# Patient Record
Sex: MALE | Race: BLACK OR AFRICAN AMERICAN | NOT HISPANIC OR LATINO | Employment: FULL TIME | URBAN - METROPOLITAN AREA
[De-identification: names, ages, dates, MRNs, and addresses within clinical notes are randomized per-mention and may not be internally consistent; named-entity substitution may affect disease eponyms.]

---

## 2019-09-27 ENCOUNTER — OFFICE VISIT (OUTPATIENT)
Dept: OBGYN CLINIC | Facility: CLINIC | Age: 59
End: 2019-09-27

## 2019-09-27 VITALS — HEART RATE: 87 BPM | SYSTOLIC BLOOD PRESSURE: 160 MMHG | DIASTOLIC BLOOD PRESSURE: 87 MMHG

## 2019-09-27 DIAGNOSIS — Z02.4 ENCOUNTER FOR CDL (COMMERCIAL DRIVING LICENSE) EXAM: ICD-10-CM

## 2019-09-27 DIAGNOSIS — Z89.611 HISTORY OF RIGHT LOWER EXTREMITY AMPUTATION (HCC): Primary | ICD-10-CM

## 2019-09-27 PROCEDURE — 99203 OFFICE O/P NEW LOW 30 MIN: CPT | Performed by: PHYSICAL MEDICINE & REHABILITATION

## 2019-09-27 NOTE — PROGRESS NOTES
1  History of right lower extremity amputation ([facility] [address] )     2  Encounter for CDL (commercial driving license) exam       No orders of the defined types were placed in this encounter  Imaging Studies (I personally reviewed images in PACS and report):  No images pertaining to this encounter  Impression:  The patient presents for  ring license examination since he has a right below-knee amputation  I filled out his paperwork today that he is fully cleared to continue driving  He has a well-fitting prostheses with no evidence of skin breakdown he is able to show me how to don and doff the prostheses without any issues  No follow-ups on file  HPI:  Maximilian Man is a 66 y o  male  who presents for evaluation of No chief complaint on file  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  He has been driving for many years without any issues  His prostheses has been fitting him without any problems  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   He has no complaints or concerns at this time  He has been able to do his job without any issues for the past few years  Review of Systems   Constitutional: Negative for activity change and fever  HENT: Negative for sore throat  Eyes: Negative for visual disturbance  Respiratory: Negative for shortness of breath  Cardiovascular: Negative for chest pain  Gastrointestinal: Negative for abdominal pain  Endocrine: Negative for polydipsia  Genitourinary: Negative for difficulty urinating  Musculoskeletal: Negative for arthralgias and gait problem  Skin: Negative for color change, pallor, rash and wound  Allergic/Immunologic: Negative for immunocompromised state  Neurological: Negative for weakness and numbness  Hematological: Does not bruise/bleed easily  Psychiatric/Behavioral: Negative for confusion         Following history reviewed and updated:  Past Medical History: Diagnosis Date    BKA stump complication ([facility] [address] )     Diabetes insipidus ([facility] [address] )      Past Surgical History:   Procedure Laterality Date    BELOW KNEE LEG AMPUTATION      LEG SURGERY      MANDIBLE FRACTURE SURGERY       Social History   Social History     Substance and Sexual Activity   Alcohol Use Not Currently     Social History     Substance and Sexual Activity   Drug Use Not Currently     Social History     Tobacco Use   Smoking Status Never Smoker   Smokeless Tobacco Never Used     History reviewed  No pertinent family history  No Known Allergies     Constitutional:  /87 (BP Location: Left arm, Patient Position: Sitting, Cuff Size: Standard)   Pulse 87    General: NAD  Eyes: Anicteric sclerae  Neck: Supple  Lungs: Unlabored breathing  Cardiovascular: No lower extremity edema  Skin: Intact without erythema  Neurologic: Sensation intact to light touch  Psychiatric: Mood and affect are appropriate  Right Knee Exam   Right knee exam is normal     Muscle Strength   The patient has normal right knee strength  Tenderness   The patient is experiencing no tenderness  Range of Motion   The patient has normal right knee ROM  Extension: normal   Flexion: normal     Other   Erythema: absent  Scars: absent  Sensation: normal  Pulse: present  Swelling: none  Effusion: no effusion present    Comments:  Full strength with knee flexion and extension  Fully healed residual limb  No fluctuance  Below knee prosthesis with good fit  Able to demonstrate donning and doffing with no issues  Procedures - none for this visit

## 2022-03-19 PROBLEM — E11.21 TYPE 2 DIABETES WITH NEPHROPATHY (HCC): Status: ACTIVE | Noted: 2018-03-23

## 2022-03-19 PROBLEM — N18.30 CKD (CHRONIC KIDNEY DISEASE) STAGE 3, GFR 30-59 ML/MIN (HCC): Status: ACTIVE | Noted: 2019-08-25

## 2023-06-29 ENCOUNTER — TELEPHONE (OUTPATIENT)
Dept: INTERNAL MEDICINE CLINIC | Facility: CLINIC | Age: 63
End: 2023-06-29

## 2023-07-06 ENCOUNTER — OFFICE VISIT (OUTPATIENT)
Dept: INTERNAL MEDICINE CLINIC | Facility: CLINIC | Age: 63
End: 2023-07-06
Payer: MEDICAID

## 2023-07-06 VITALS
SYSTOLIC BLOOD PRESSURE: 155 MMHG | WEIGHT: 158 LBS | HEIGHT: 76 IN | DIASTOLIC BLOOD PRESSURE: 85 MMHG | OXYGEN SATURATION: 100 % | TEMPERATURE: 97.4 F | BODY MASS INDEX: 19.24 KG/M2 | HEART RATE: 79 BPM

## 2023-07-06 DIAGNOSIS — Z89.511 S/P BKA (BELOW KNEE AMPUTATION), RIGHT (HCC): ICD-10-CM

## 2023-07-06 DIAGNOSIS — E11.65 TYPE 2 DIABETES MELLITUS WITH HYPERGLYCEMIA, WITHOUT LONG-TERM CURRENT USE OF INSULIN (HCC): Primary | ICD-10-CM

## 2023-07-06 DIAGNOSIS — N18.4 CHRONIC KIDNEY DISEASE, STAGE 4 (SEVERE) (HCC): ICD-10-CM

## 2023-07-06 DIAGNOSIS — T87.9: ICD-10-CM

## 2023-07-06 DIAGNOSIS — N40.0 BENIGN PROSTATIC HYPERPLASIA WITHOUT LOWER URINARY TRACT SYMPTOMS: ICD-10-CM

## 2023-07-06 LAB
ALBUMIN SERPL-MCNC: 3.3 G/DL (ref 3.2–4.6)
ALBUMIN/GLOB SERPL: 1 (ref 0.4–1.6)
ALP SERPL-CCNC: 168 U/L (ref 50–136)
ALT SERPL-CCNC: 17 U/L (ref 12–65)
ANION GAP SERPL CALC-SCNC: 8 MMOL/L (ref 2–11)
AST SERPL-CCNC: 9 U/L (ref 15–37)
BASOPHILS # BLD: 0.1 K/UL (ref 0–0.2)
BASOPHILS NFR BLD: 1 % (ref 0–2)
BILIRUB SERPL-MCNC: 0.4 MG/DL (ref 0.2–1.1)
BUN SERPL-MCNC: 46 MG/DL (ref 8–23)
CALCIUM SERPL-MCNC: 9 MG/DL (ref 8.3–10.4)
CHLORIDE SERPL-SCNC: 106 MMOL/L (ref 101–110)
CHOLEST SERPL-MCNC: 140 MG/DL
CO2 SERPL-SCNC: 22 MMOL/L (ref 21–32)
CREAT SERPL-MCNC: 4.1 MG/DL (ref 0.8–1.5)
CREAT UR-MCNC: 98 MG/DL
DIFFERENTIAL METHOD BLD: ABNORMAL
EOSINOPHIL # BLD: 0.1 K/UL (ref 0–0.8)
EOSINOPHIL NFR BLD: 2 % (ref 0.5–7.8)
ERYTHROCYTE [DISTWIDTH] IN BLOOD BY AUTOMATED COUNT: 12.1 % (ref 11.9–14.6)
GLOBULIN SER CALC-MCNC: 3.2 G/DL (ref 2.8–4.5)
GLUCOSE SERPL-MCNC: 427 MG/DL (ref 65–100)
GLUCOSE, POC: 423 MG/DL
HCT VFR BLD AUTO: 34.1 % (ref 41.1–50.3)
HDLC SERPL-MCNC: 61 MG/DL (ref 40–60)
HDLC SERPL: 2.3
HGB BLD-MCNC: 10.7 G/DL (ref 13.6–17.2)
IMM GRANULOCYTES # BLD AUTO: 0.1 K/UL (ref 0–0.5)
IMM GRANULOCYTES NFR BLD AUTO: 1 % (ref 0–5)
LDLC SERPL CALC-MCNC: 63.8 MG/DL
LYMPHOCYTES # BLD: 1.6 K/UL (ref 0.5–4.6)
LYMPHOCYTES NFR BLD: 19 % (ref 13–44)
MCH RBC QN AUTO: 30.3 PG (ref 26.1–32.9)
MCHC RBC AUTO-ENTMCNC: 31.4 G/DL (ref 31.4–35)
MCV RBC AUTO: 96.6 FL (ref 82–102)
MICROALBUMIN UR-MCNC: 145 MG/DL
MICROALBUMIN/CREAT UR-RTO: 1480 MG/G (ref 0–30)
MONOCYTES # BLD: 0.4 K/UL (ref 0.1–1.3)
MONOCYTES NFR BLD: 5 % (ref 4–12)
NEUTS SEG # BLD: 6 K/UL (ref 1.7–8.2)
NEUTS SEG NFR BLD: 72 % (ref 43–78)
NRBC # BLD: 0 K/UL (ref 0–0.2)
PLATELET # BLD AUTO: 252 K/UL (ref 150–450)
PMV BLD AUTO: 11.6 FL (ref 9.4–12.3)
POTASSIUM SERPL-SCNC: 4.4 MMOL/L (ref 3.5–5.1)
PROT SERPL-MCNC: 6.5 G/DL (ref 6.3–8.2)
PSA SERPL-MCNC: 0.7 NG/ML
RBC # BLD AUTO: 3.53 M/UL (ref 4.23–5.6)
SODIUM SERPL-SCNC: 136 MMOL/L (ref 133–143)
TRIGL SERPL-MCNC: 76 MG/DL (ref 35–150)
VLDLC SERPL CALC-MCNC: 15.2 MG/DL (ref 6–23)
WBC # BLD AUTO: 8.2 K/UL (ref 4.3–11.1)

## 2023-07-06 PROCEDURE — 82962 GLUCOSE BLOOD TEST: CPT | Performed by: NURSE PRACTITIONER

## 2023-07-06 PROCEDURE — 99214 OFFICE O/P EST MOD 30 MIN: CPT | Performed by: NURSE PRACTITIONER

## 2023-07-06 RX ORDER — INSULIN GLARGINE 100 [IU]/ML
10 INJECTION, SOLUTION SUBCUTANEOUS NIGHTLY
Qty: 5 ADJUSTABLE DOSE PRE-FILLED PEN SYRINGE | Refills: 0 | Status: SHIPPED | OUTPATIENT
Start: 2023-07-06

## 2023-07-06 RX ORDER — TAMSULOSIN HYDROCHLORIDE 0.4 MG/1
0.4 CAPSULE ORAL DAILY
Qty: 90 CAPSULE | Refills: 1 | Status: SHIPPED | OUTPATIENT
Start: 2023-07-06

## 2023-07-06 RX ORDER — ALOGLIPTIN 25 MG/1
25 TABLET, FILM COATED ORAL DAILY
Qty: 90 TABLET | Refills: 1 | Status: SHIPPED | OUTPATIENT
Start: 2023-07-06

## 2023-07-06 ASSESSMENT — PATIENT HEALTH QUESTIONNAIRE - PHQ9
SUM OF ALL RESPONSES TO PHQ9 QUESTIONS 1 & 2: 0
1. LITTLE INTEREST OR PLEASURE IN DOING THINGS: 0
SUM OF ALL RESPONSES TO PHQ QUESTIONS 1-9: 0
2. FEELING DOWN, DEPRESSED OR HOPELESS: 0
SUM OF ALL RESPONSES TO PHQ QUESTIONS 1-9: 0

## 2023-07-06 ASSESSMENT — ENCOUNTER SYMPTOMS: BLOOD IN STOOL: 0

## 2023-07-06 NOTE — PROGRESS NOTES
insulin injected in the office subcutaneously, he was given a blood sugar monitor and supplies for him to begin to monitor. I called in his alogliptin and starting long-acting insulin at 10 units daily in anticipation of an A1c of over 10. We will readjust if needed. We will have him follow-up closely in 1 week and adjust his insulin up as needed. Discussed the delirious effects of untreated hyperglycemia for his health. 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 He verbalized understanding.  - Hemoglobin A1C; Future  - Comprehensive Metabolic Panel; Future  - CBC with Auto Differential; Future  - alogliptin (NESINA) 25 MG TABS tablet; Take 1 tablet by mouth daily  Dispense: 90 tablet; Refill: 1  - Microalbumin / Creatinine Urine Ratio; Future  - insulin glargine (BASAGLAR KWIKPEN) 100 UNIT/ML injection pen; Inject 10 Units into the skin nightly  Dispense: 5 Adjustable Dose Pre-filled Pen Syringe; Refill: 0  - Hemoglobin A1C  - CBC with Auto Differential  - Comprehensive Metabolic Panel  - Microalbumin / Creatinine Urine Ratio    2. S/P BKA (below knee amputation), right ([address])      3. Unspecified complications of amputation stump ([address])  Needs an order for a new compression sleeve to avoid ulcer formation, written for him today. 4. Benign prostatic hyperplasia without lower urinary tract symptoms  Refills provided  - PSA Screening; Future  - tamsulosin (FLOMAX) 0.4 MG capsule; Take 1 capsule by mouth daily TAKE 1 CAPSULE BY MOUTH ONCE DAILY  Dispense: 90 capsule; Refill: 1  - PSA Screening    5. Chronic kidney disease, stage 4 (severe) ([facility])    - Comprehensive Metabolic Panel; Future  - Comprehensive Metabolic Panel      Discussed HPI with Dr. Carlyn Gilbert. He agrees to plan of care.     LIEN Park - CNP

## 2023-07-07 ENCOUNTER — TELEPHONE (OUTPATIENT)
Dept: INTERNAL MEDICINE CLINIC | Facility: CLINIC | Age: 63
End: 2023-07-07

## 2023-07-07 DIAGNOSIS — N18.9 ACUTE KIDNEY INJURY SUPERIMPOSED ON CKD (HCC): Primary | ICD-10-CM

## 2023-07-07 DIAGNOSIS — N17.9 ACUTE KIDNEY INJURY SUPERIMPOSED ON CKD (HCC): Primary | ICD-10-CM

## 2023-07-07 LAB
EST. AVERAGE GLUCOSE BLD GHB EST-MCNC: 283 MG/DL
HBA1C MFR BLD: 11.5 % (ref 4.8–5.6)

## 2023-07-09 ENCOUNTER — TELEPHONE (OUTPATIENT)
Dept: INTERNAL MEDICINE CLINIC | Facility: CLINIC | Age: 63
End: 2023-07-09

## 2023-07-10 NOTE — TELEPHONE ENCOUNTER
I have talked with Mr. Guerita Lord and have given him this message. He also stated that he hasn't gotten in with the nephrologist yet.

## 2023-07-12 NOTE — TELEPHONE ENCOUNTER
Left message for pt to return call    [place] [facility] New Patient referral coordinator is out till 07/17/2023 was told this would get addressed when she gets back. Pt is scheduled for a f/u with you tomorrow. adolfo Lore

## 2023-07-13 ENCOUNTER — OFFICE VISIT (OUTPATIENT)
Dept: INTERNAL MEDICINE CLINIC | Facility: CLINIC | Age: 63
End: 2023-07-13
Payer: MEDICAID

## 2023-07-13 VITALS
BODY MASS INDEX: 19.48 KG/M2 | WEIGHT: 160 LBS | DIASTOLIC BLOOD PRESSURE: 81 MMHG | SYSTOLIC BLOOD PRESSURE: 141 MMHG | TEMPERATURE: 98.1 F | HEIGHT: 76 IN | OXYGEN SATURATION: 100 % | HEART RATE: 78 BPM

## 2023-07-13 DIAGNOSIS — Z79.4 TYPE 2 DIABETES MELLITUS WITH HYPERGLYCEMIA, WITH LONG-TERM CURRENT USE OF INSULIN (HCC): ICD-10-CM

## 2023-07-13 DIAGNOSIS — E11.65 TYPE 2 DIABETES MELLITUS WITH HYPERGLYCEMIA, WITH LONG-TERM CURRENT USE OF INSULIN (HCC): Primary | ICD-10-CM

## 2023-07-13 DIAGNOSIS — Z79.4 TYPE 2 DIABETES MELLITUS WITH HYPERGLYCEMIA, WITH LONG-TERM CURRENT USE OF INSULIN (HCC): Primary | ICD-10-CM

## 2023-07-13 DIAGNOSIS — E11.65 TYPE 2 DIABETES MELLITUS WITH HYPERGLYCEMIA, WITH LONG-TERM CURRENT USE OF INSULIN (HCC): ICD-10-CM

## 2023-07-13 PROCEDURE — 99214 OFFICE O/P EST MOD 30 MIN: CPT | Performed by: NURSE PRACTITIONER

## 2023-07-13 PROCEDURE — 3046F HEMOGLOBIN A1C LEVEL >9.0%: CPT | Performed by: NURSE PRACTITIONER

## 2023-07-13 RX ORDER — INSULIN GLARGINE 100 [IU]/ML
20 INJECTION, SOLUTION SUBCUTANEOUS NIGHTLY
Qty: 5 ADJUSTABLE DOSE PRE-FILLED PEN SYRINGE | Refills: 0
Start: 2023-07-13

## 2023-07-13 RX ORDER — LANCETS 33 GAUGE
1 EACH MISCELLANEOUS DAILY
Qty: 100 EACH | Refills: 3 | Status: SHIPPED | OUTPATIENT
Start: 2023-07-13

## 2023-07-13 RX ORDER — LANCETS
1 EACH MISCELLANEOUS DAILY
Qty: 100 EACH | Refills: 3 | Status: SHIPPED | OUTPATIENT
Start: 2023-07-13

## 2023-07-13 RX ORDER — LANCETS 30 GAUGE
1 EACH MISCELLANEOUS DAILY
Qty: 100 EACH | Refills: 3 | Status: SHIPPED | OUTPATIENT
Start: 2023-07-13

## 2023-07-13 SDOH — ECONOMIC STABILITY: INCOME INSECURITY: HOW HARD IS IT FOR YOU TO PAY FOR THE VERY BASICS LIKE FOOD, HOUSING, MEDICAL CARE, AND HEATING?: NOT VERY HARD

## 2023-07-13 SDOH — ECONOMIC STABILITY: FOOD INSECURITY: WITHIN THE PAST 12 MONTHS, YOU WORRIED THAT YOUR FOOD WOULD RUN OUT BEFORE YOU GOT MONEY TO BUY MORE.: SOMETIMES TRUE

## 2023-07-13 SDOH — ECONOMIC STABILITY: FOOD INSECURITY: WITHIN THE PAST 12 MONTHS, THE FOOD YOU BOUGHT JUST DIDN'T LAST AND YOU DIDN'T HAVE MONEY TO GET MORE.: SOMETIMES TRUE

## 2023-07-13 SDOH — ECONOMIC STABILITY: HOUSING INSECURITY
IN THE LAST 12 MONTHS, WAS THERE A TIME WHEN YOU DID NOT HAVE A STEADY PLACE TO SLEEP OR SLEPT IN A SHELTER (INCLUDING NOW)?: NO

## 2023-07-13 ASSESSMENT — ENCOUNTER SYMPTOMS: SHORTNESS OF BREATH: 0

## 2023-07-13 NOTE — PROGRESS NOTES
week.  Supplies have been ordered. He will let us know if he wants to continue having a Dexcom continuous glucose monitor. He does follow-up with nephrology. We reviewed the importance of getting his blood sugar under control and possible complications of hyperglycemia will cause. He assured me that he will follow-up with nephrology and continue to upward titrate his long-acting insulin to get his fasting blood sugars below 150.  - insulin glargine (BASAGLAR KWIKPEN) 100 UNIT/ML injection pen; Inject 20 Units into the skin nightly  Dispense: 5 Adjustable Dose Pre-filled Pen Syringe; Refill: 0  - Accu-Chek Multiclix Lancets MISC; 1 applicator by Does not apply route daily  Dispense: 100 each; Refill: 3  - blood glucose test strips (ASCENSIA AUTODISC VI;ONE TOUCH ULTRA TEST VI) strip; 1 each by In Vitro route daily As needed. Dispense: 100 each; Refill: 3  - Insulin Pen Needle 29G X 12MM MISC; 1 each by Does not apply route daily  Dispense: 100 each;  Refill: 3          LIEN Yarbrough - CNP

## 2023-07-13 NOTE — TELEPHONE ENCOUNTER
I assume this is it. They have other options with specific gauges. I have pended that as well for you to choose from.

## 2023-07-27 ENCOUNTER — TELEPHONE (OUTPATIENT)
Dept: INTERNAL MEDICINE CLINIC | Facility: CLINIC | Age: 63
End: 2023-07-27

## 2023-07-27 DIAGNOSIS — D64.9 ANEMIA, UNSPECIFIED TYPE: Primary | ICD-10-CM

## 2023-07-27 NOTE — TELEPHONE ENCOUNTER
Tell him ultrasound looks good. He should be hearing from the kidney specialist about these things as well. Thanks!

## 2023-07-27 NOTE — TELEPHONE ENCOUNTER
Pt scheduled for Monday. He mentions that he had blood work done 7/24 along with a kidney US that he has not heard back from. I see the lab work in care everywhere but not the [address]

## 2023-07-31 ENCOUNTER — NURSE ONLY (OUTPATIENT)
Dept: INTERNAL MEDICINE CLINIC | Facility: CLINIC | Age: 63
End: 2023-07-31

## 2023-07-31 DIAGNOSIS — D64.9 ANEMIA, UNSPECIFIED TYPE: ICD-10-CM

## 2023-08-01 LAB
FERRITIN SERPL-MCNC: 221 NG/ML (ref 8–388)
FOLATE SERPL-MCNC: 6.5 NG/ML (ref 3.1–17.5)
TIBC SERPL-MCNC: 250 UG/DL (ref 250–450)
VIT B12 SERPL-MCNC: 443 PG/ML (ref 193–986)

## 2023-11-03 ENCOUNTER — OFFICE VISIT (OUTPATIENT)
Dept: INTERNAL MEDICINE CLINIC | Facility: CLINIC | Age: 63
End: 2023-11-03
Payer: MEDICAID

## 2023-11-03 VITALS
TEMPERATURE: 99 F | HEIGHT: 76 IN | BODY MASS INDEX: 20.46 KG/M2 | DIASTOLIC BLOOD PRESSURE: 67 MMHG | SYSTOLIC BLOOD PRESSURE: 125 MMHG | WEIGHT: 168 LBS | RESPIRATION RATE: 16 BRPM | HEART RATE: 84 BPM

## 2023-11-03 DIAGNOSIS — N18.31 STAGE 3A CHRONIC KIDNEY DISEASE (HCC): ICD-10-CM

## 2023-11-03 DIAGNOSIS — Z79.4 TYPE 2 DIABETES MELLITUS WITH HYPERGLYCEMIA, WITH LONG-TERM CURRENT USE OF INSULIN (HCC): ICD-10-CM

## 2023-11-03 DIAGNOSIS — Z23 NEEDS FLU SHOT: Primary | ICD-10-CM

## 2023-11-03 DIAGNOSIS — S76.112D RUPTURE OF LEFT QUADRICEPS MUSCLE, SUBSEQUENT ENCOUNTER: ICD-10-CM

## 2023-11-03 DIAGNOSIS — E11.21 TYPE 2 DIABETES MELLITUS WITH DIABETIC NEPHROPATHY, WITH LONG-TERM CURRENT USE OF INSULIN (HCC): ICD-10-CM

## 2023-11-03 DIAGNOSIS — Z79.4 TYPE 2 DIABETES MELLITUS WITH DIABETIC NEPHROPATHY, WITH LONG-TERM CURRENT USE OF INSULIN (HCC): ICD-10-CM

## 2023-11-03 DIAGNOSIS — R53.82 CHRONIC FATIGUE: ICD-10-CM

## 2023-11-03 DIAGNOSIS — N40.0 BENIGN PROSTATIC HYPERPLASIA WITHOUT LOWER URINARY TRACT SYMPTOMS: ICD-10-CM

## 2023-11-03 DIAGNOSIS — E11.65 TYPE 2 DIABETES MELLITUS WITH HYPERGLYCEMIA, WITH LONG-TERM CURRENT USE OF INSULIN (HCC): ICD-10-CM

## 2023-11-03 LAB
ALBUMIN SERPL-MCNC: 3.3 G/DL (ref 3.2–4.6)
ALBUMIN/GLOB SERPL: 0.9 (ref 0.4–1.6)
ALP SERPL-CCNC: 73 U/L (ref 50–136)
ALT SERPL-CCNC: 17 U/L (ref 12–65)
ANION GAP SERPL CALC-SCNC: 11 MMOL/L (ref 2–11)
AST SERPL-CCNC: 18 U/L (ref 15–37)
BASOPHILS # BLD: 0.1 K/UL (ref 0–0.2)
BASOPHILS NFR BLD: 1 % (ref 0–2)
BILIRUB SERPL-MCNC: 0.3 MG/DL (ref 0.2–1.1)
BUN SERPL-MCNC: 69 MG/DL (ref 8–23)
CALCIUM SERPL-MCNC: 8.3 MG/DL (ref 8.3–10.4)
CHLORIDE SERPL-SCNC: 115 MMOL/L (ref 101–110)
CHOLEST SERPL-MCNC: 142 MG/DL
CO2 SERPL-SCNC: 12 MMOL/L (ref 21–32)
CREAT SERPL-MCNC: 5.1 MG/DL (ref 0.8–1.5)
DIFFERENTIAL METHOD BLD: ABNORMAL
EOSINOPHIL # BLD: 0.1 K/UL (ref 0–0.8)
EOSINOPHIL NFR BLD: 2 % (ref 0.5–7.8)
ERYTHROCYTE [DISTWIDTH] IN BLOOD BY AUTOMATED COUNT: 12.5 % (ref 11.9–14.6)
EST. AVERAGE GLUCOSE BLD GHB EST-MCNC: 117 MG/DL
GLOBULIN SER CALC-MCNC: 3.8 G/DL (ref 2.8–4.5)
GLUCOSE SERPL-MCNC: 90 MG/DL (ref 65–100)
HBA1C MFR BLD: 5.7 % (ref 4.8–5.6)
HCT VFR BLD AUTO: 26.5 % (ref 41.1–50.3)
HDLC SERPL-MCNC: 78 MG/DL (ref 40–60)
HDLC SERPL: 1.8
HGB BLD-MCNC: 8 G/DL (ref 13.6–17.2)
IMM GRANULOCYTES # BLD AUTO: 0 K/UL (ref 0–0.5)
IMM GRANULOCYTES NFR BLD AUTO: 0 % (ref 0–5)
LDLC SERPL CALC-MCNC: 54.2 MG/DL
LYMPHOCYTES # BLD: 1.7 K/UL (ref 0.5–4.6)
LYMPHOCYTES NFR BLD: 24 % (ref 13–44)
MCH RBC QN AUTO: 29.4 PG (ref 26.1–32.9)
MCHC RBC AUTO-ENTMCNC: 30.2 G/DL (ref 31.4–35)
MCV RBC AUTO: 97.4 FL (ref 82–102)
MONOCYTES # BLD: 0.6 K/UL (ref 0.1–1.3)
MONOCYTES NFR BLD: 9 % (ref 4–12)
NEUTS SEG # BLD: 4.6 K/UL (ref 1.7–8.2)
NEUTS SEG NFR BLD: 64 % (ref 43–78)
NRBC # BLD: 0 K/UL (ref 0–0.2)
PLATELET # BLD AUTO: 214 K/UL (ref 150–450)
PMV BLD AUTO: 10.9 FL (ref 9.4–12.3)
POTASSIUM SERPL-SCNC: 4 MMOL/L (ref 3.5–5.1)
PROT SERPL-MCNC: 7.1 G/DL (ref 6.3–8.2)
RBC # BLD AUTO: 2.72 M/UL (ref 4.23–5.6)
SODIUM SERPL-SCNC: 138 MMOL/L (ref 133–143)
TRIGL SERPL-MCNC: 49 MG/DL (ref 35–150)
VLDLC SERPL CALC-MCNC: 9.8 MG/DL (ref 6–23)
WBC # BLD AUTO: 7.1 K/UL (ref 4.3–11.1)

## 2023-11-03 PROCEDURE — 99214 OFFICE O/P EST MOD 30 MIN: CPT | Performed by: INTERNAL MEDICINE

## 2023-11-03 PROCEDURE — 90674 CCIIV4 VAC NO PRSV 0.5 ML IM: CPT | Performed by: INTERNAL MEDICINE

## 2023-11-03 PROCEDURE — 3046F HEMOGLOBIN A1C LEVEL >9.0%: CPT | Performed by: INTERNAL MEDICINE

## 2023-11-03 PROCEDURE — 90471 IMMUNIZATION ADMIN: CPT | Performed by: INTERNAL MEDICINE

## 2023-11-03 RX ORDER — HYDROCODONE BITARTRATE AND ACETAMINOPHEN 5; 325 MG/1; MG/1
TABLET ORAL
COMMUNITY
Start: 2023-10-31

## 2023-11-03 NOTE — PROGRESS NOTES
Comprehensive Metabolic Panel; Future  - Comprehensive Metabolic Panel  - CBC with Auto Differential      No follow-up provider specified.         Bernardo Alatorre MD

## 2023-11-04 ENCOUNTER — TELEPHONE (OUTPATIENT)
Dept: INTERNAL MEDICINE CLINIC | Facility: CLINIC | Age: 63
End: 2023-11-04

## 2023-11-04 ENCOUNTER — HOSPITAL ENCOUNTER (EMERGENCY)
Age: 63
Discharge: HOME | End: 2023-11-04
Attending: EMERGENCY MEDICINE
Payer: MEDICAID

## 2023-11-04 VITALS
SYSTOLIC BLOOD PRESSURE: 134 MMHG | BODY MASS INDEX: 20.46 KG/M2 | DIASTOLIC BLOOD PRESSURE: 73 MMHG | TEMPERATURE: 97.7 F | HEART RATE: 89 BPM | OXYGEN SATURATION: 97 % | WEIGHT: 168 LBS | HEIGHT: 76 IN | RESPIRATION RATE: 16 BRPM

## 2023-11-04 DIAGNOSIS — E86.9 VOLUME DEPLETION: ICD-10-CM

## 2023-11-04 DIAGNOSIS — N18.9 CHRONIC RENAL IMPAIRMENT, UNSPECIFIED CKD STAGE: Primary | ICD-10-CM

## 2023-11-04 LAB
ANION GAP SERPL CALC-SCNC: 10 MMOL/L (ref 2–11)
BUN SERPL-MCNC: 76 MG/DL (ref 8–23)
CALCIUM SERPL-MCNC: 8.5 MG/DL (ref 8.3–10.4)
CHLORIDE SERPL-SCNC: 116 MMOL/L (ref 101–110)
CO2 SERPL-SCNC: 14 MMOL/L (ref 21–32)
CREAT SERPL-MCNC: 5.2 MG/DL (ref 0.8–1.5)
GLUCOSE SERPL-MCNC: 110 MG/DL (ref 65–100)
POTASSIUM SERPL-SCNC: 4.1 MMOL/L (ref 3.5–5.1)
SODIUM SERPL-SCNC: 140 MMOL/L (ref 133–143)

## 2023-11-04 PROCEDURE — 99284 EMERGENCY DEPT VISIT MOD MDM: CPT

## 2023-11-04 PROCEDURE — 96361 HYDRATE IV INFUSION ADD-ON: CPT

## 2023-11-04 PROCEDURE — 80048 BASIC METABOLIC PNL TOTAL CA: CPT

## 2023-11-04 PROCEDURE — 2580000003 HC RX 258: Performed by: EMERGENCY MEDICINE

## 2023-11-04 PROCEDURE — 96360 HYDRATION IV INFUSION INIT: CPT

## 2023-11-04 RX ORDER — 0.9 % SODIUM CHLORIDE 0.9 %
1000 INTRAVENOUS SOLUTION INTRAVENOUS
Status: COMPLETED | OUTPATIENT
Start: 2023-11-04 | End: 2023-11-04

## 2023-11-04 RX ADMIN — SODIUM CHLORIDE 1000 ML: 9 INJECTION, SOLUTION INTRAVENOUS at 10:15

## 2023-11-04 ASSESSMENT — ENCOUNTER SYMPTOMS
SHORTNESS OF BREATH: 0
COUGH: 0

## 2023-11-04 NOTE — ED TRIAGE NOTES
Patient a 64 y/o Male reports to the ED with c/c of having abnormal labs yesterday at his   and was advised to come to the ED for IV fluids due to his creatine being elevated. Denies any problems voiding.

## 2023-11-04 NOTE — TELEPHONE ENCOUNTER
Received call from lab for critical CO2 12.   Reviewed rest of labs when they were resulted.   Worsening CKD   Lab Results   Component Value Date/Time     11/03/2023 02:40 PM    K 4.0 11/03/2023 02:40 PM     11/03/2023 02:40 PM    CO2 12 11/03/2023 02:40 PM    BUN 69 11/03/2023 02:40 PM    CREATININE 5.10 11/03/2023 02:40 PM    GLUCOSE 90 11/03/2023 02:40 PM    CALCIUM 8.3 11/03/2023 02:40 PM    LABGLOM 12 11/03/2023 02:40 PM    LABGLOM 22 04/22/2022 10:45 AM        GFR 12.  Hgb 8 probably anemia associated with CKD. Chronic watery diarrhea. No black or bloody stools.  Recommend he go to ER for IVF  for worsening CKD possible pre renal component.  Needs to reschedule missed appt with nephrologist.

## 2023-11-07 ENCOUNTER — TELEPHONE (OUTPATIENT)
Dept: ORTHOPEDIC SURGERY | Age: 63
End: 2023-11-07

## 2023-11-07 NOTE — TELEPHONE ENCOUNTER
Left ruptured quadriceps tendon   Referral  came in . No films  Pt  has  had  this  happen  in th e past  and  had  surgery in  1992    He  feels  he  needs to  go straight to a  surgeon. I have  him  scheduled  with  Luis Rivera- she had an opening on 11/17    Pt  fell while in [place] can  hardly move  his leg. Can someone  see him soon?

## 2023-11-08 ENCOUNTER — OFFICE VISIT (OUTPATIENT)
Dept: ORTHOPEDIC SURGERY | Age: 63
End: 2023-11-08

## 2023-11-08 DIAGNOSIS — M25.562 LEFT KNEE PAIN, UNSPECIFIED CHRONICITY: Primary | ICD-10-CM

## 2023-11-08 DIAGNOSIS — S76.112A RUPTURE OF LEFT QUADRICEPS TENDON, INITIAL ENCOUNTER: ICD-10-CM

## 2023-11-08 PROBLEM — E55.9 VITAMIN D DEFICIENCY: Status: ACTIVE | Noted: 2023-08-14

## 2023-11-08 PROBLEM — I10 ESSENTIAL HYPERTENSION: Status: ACTIVE | Noted: 2023-08-14

## 2023-11-08 NOTE — PROGRESS NOTES
well as some patellofemoral chondromalacia. There is calcification seen in the soft tissue about 2 to 3 cm superior to the patella indicative of quadriceps rupture. Medial lateral joint spaces show chondrocalcinosis but otherwise are well-maintained. Assessment:     ICD-10-CM    1. Left knee pain, unspecified chronicity  M25.562 XR KNEE LEFT (MIN 4 VIEWS)     MRI KNEE LEFT WO CONTRAST      2. Rupture of left quadriceps tendon, initial encounter  E43.942Q MRI KNEE LEFT WO CONTRAST          Plan:  I had a long discussion with the patient regarding the natural history of the problem, as well as treatment options. The patient had an injury with a fall. A pop was felt and sudden sharp onset of pain was noted. They cannot ambulate correctly. There is clinical evidence of a quad tendon tear. I talked with the patient extensively about the findings and surgical intervention. I spoke at length about the risks and benefits, reasonable expectations, and expected recovery time. The risks include but are not limited to; bleeding, infection, stiffness, weakness, re-tear, hematoma, DVT, PE, need for further surgery, MI and anesthesia related risks. We reviewed what was expected postoperatively and that compliance is very important for the best potential result. I answered all of their questions today. Information from the [facility] was given. The patient's MRI will be ordered as stat. Typically his insurance takes 14 days to prior off. This is an acute major injury that typically require surgical intervention if MRI confirms complete quad rupture within 7 to 10 days. Increased time or delay from this increases his risk of healing issues on top of the other medical comorbidities such as diabetes and his chronic renal issues so this is of the utmost importance. We will reach out to his primary doctor to see if there is anything more we can do to help prep him medically for surgery.     Given the chronicity and severity of

## 2023-11-09 ENCOUNTER — OFFICE VISIT (OUTPATIENT)
Dept: ORTHOPEDIC SURGERY | Age: 63
End: 2023-11-09

## 2023-11-09 VITALS — BODY MASS INDEX: 20.46 KG/M2 | HEIGHT: 76 IN | WEIGHT: 168 LBS

## 2023-11-09 DIAGNOSIS — S76.112A RUPTURE OF LEFT QUADRICEPS TENDON, INITIAL ENCOUNTER: ICD-10-CM

## 2023-11-09 DIAGNOSIS — M25.562 LEFT KNEE PAIN, UNSPECIFIED CHRONICITY: Primary | ICD-10-CM

## 2023-11-09 NOTE — PROGRESS NOTES
The brace was properly aligned medially and laterally along the length of the left Knee with the extension/flexion dials placed slightly above the patella (to insure that if brace slides down slightly the dials will still line up on either side of the patella/knee region). The brace is extended/shorten to the patient's leg length and to insure proper fit of the brace. The straps are tightened starting with the most distal strap and then strap proximal to the patella. The strap right below the patella is then secured and last is the strap highest on the quad. The straps are then trimmed for easier tightening of the brace for the patient. Patient read and signed documenting they understand and agree to [facility]'s current DME return policy.

## 2023-11-13 ENCOUNTER — TELEPHONE (OUTPATIENT)
Dept: ORTHOPEDIC SURGERY | Age: 63
End: 2023-11-13

## 2023-11-13 ENCOUNTER — TELEPHONE (OUTPATIENT)
Dept: INTERNAL MEDICINE CLINIC | Facility: CLINIC | Age: 63
End: 2023-11-13

## 2023-11-13 DIAGNOSIS — M25.562 LEFT KNEE PAIN, UNSPECIFIED CHRONICITY: Primary | ICD-10-CM

## 2023-11-13 DIAGNOSIS — S76.112A RUPTURE OF LEFT QUADRICEPS TENDON, INITIAL ENCOUNTER: ICD-10-CM

## 2023-11-13 NOTE — PROGRESS NOTES
Name: Jadon Barnes  YOB: 1960  Gender: male  MRN: [id number]    CC:   Chief Complaint   Patient presents with    Pre-op Exam     Surgical discussion        HPI: Patient presents for MRI follow-up of the left knee. No major changes in symptoms. Recall from initial visit:The patient got up to use the restroom on 10-. He has a BKA on the contralateral knee and he did not properly put his prosthetic on. He states that it slipped off and he fell. He had a pop in the knee. Patient does have a history of prior quad tendon repair in 1992 that was done in [facility]. Denies any pains or symptoms prior to this event. Denies numbness and tingling. No Known Allergies  Past Medical History:   Diagnosis Date    Current use of insulin ([address]) 9/24/2015    Depression 9/24/2015    Hemorrhoids 9/24/2015    Renal failure 9/24/2015    Type II diabetes mellitus ([address]) 9/24/2015     History reviewed. No pertinent surgical history. History reviewed. No pertinent family history.   Social History     Socioeconomic History    Marital status:      Spouse name: Not on file    Number of children: Not on file    Years of education: Not on file    Highest education level: Not on file   Occupational History    Not on file   Tobacco Use    Smoking status: Never    Smokeless tobacco: Never   Substance and Sexual Activity    Alcohol use: Not on file    Drug use: Yes    Sexual activity: Not on file   Other Topics Concern    Not on file   Social History Narrative    Not on file     Social Determinants of Health     Financial Resource Strain: Low Risk  (7/13/2023)    Overall Financial Resource Strain (CARDIA)     Difficulty of Paying Living Expenses: Not very hard   Food Insecurity: Food Insecurity Present (7/13/2023)    Hunger Vital Sign     Worried About Running Out of Food in the Last Year: Sometimes true     Ran Out of Food in the Last Year: Sometimes true   Transportation Needs: Unknown

## 2023-11-15 ENCOUNTER — ANESTHESIA EVENT (OUTPATIENT)
Dept: SURGERY | Age: 63
End: 2023-11-15
Payer: MEDICAID

## 2023-11-15 ENCOUNTER — HOME CARE VISIT (OUTPATIENT)
Dept: SCHEDULING | Facility: HOME HEALTH | Age: 63
End: 2023-11-15
Payer: MEDICAID

## 2023-11-15 ENCOUNTER — HOME HEALTH ADMISSION (OUTPATIENT)
Dept: HOME HEALTH SERVICES | Facility: HOME HEALTH | Age: 63
End: 2023-11-15
Payer: MEDICAID

## 2023-11-15 DIAGNOSIS — S76.112D RUPTURE OF LEFT QUADRICEPS TENDON, SUBSEQUENT ENCOUNTER: Primary | ICD-10-CM

## 2023-11-15 DIAGNOSIS — S76.112A RUPTURE OF LEFT QUADRICEPS TENDON, INITIAL ENCOUNTER: ICD-10-CM

## 2023-11-15 DIAGNOSIS — M25.562 LEFT KNEE PAIN, UNSPECIFIED CHRONICITY: Primary | ICD-10-CM

## 2023-11-15 PROCEDURE — G0151 HHCP-SERV OF PT,EA 15 MIN: HCPCS

## 2023-11-15 RX ORDER — SODIUM BICARBONATE 650 MG/1
1300 TABLET ORAL 2 TIMES DAILY
COMMUNITY
Start: 2023-08-14

## 2023-11-15 RX ORDER — EPOETIN ALFA-EPBX 10000 [IU]/ML
10000 INJECTION, SOLUTION INTRAVENOUS; SUBCUTANEOUS
Qty: 1 ML | Refills: 11 | COMMUNITY
Start: 2023-08-14 | End: 2024-08-13

## 2023-11-15 RX ORDER — SODIUM CHLORIDE 9 MG/ML
INJECTION, SOLUTION INTRAVENOUS CONTINUOUS
Status: CANCELLED | OUTPATIENT
Start: 2023-11-15

## 2023-11-15 RX ORDER — ONDANSETRON 8 MG/1
4 TABLET, ORALLY DISINTEGRATING ORAL EVERY 6 HOURS
Qty: 16 TABLET | Refills: 0 | Status: CANCELLED | OUTPATIENT
Start: 2023-11-15

## 2023-11-15 RX ORDER — AMOXICILLIN 250 MG
1 CAPSULE ORAL DAILY
Qty: 21 TABLET | Refills: 0 | Status: CANCELLED | OUTPATIENT
Start: 2023-11-15

## 2023-11-15 RX ORDER — ASPIRIN 325 MG
325 TABLET ORAL DAILY
Qty: 7 TABLET | Refills: 0 | Status: CANCELLED | OUTPATIENT
Start: 2023-11-15 | End: 2023-11-22

## 2023-11-15 RX ORDER — SODIUM CHLORIDE 0.9 % (FLUSH) 0.9 %
5-40 SYRINGE (ML) INJECTION EVERY 12 HOURS SCHEDULED
Status: CANCELLED | OUTPATIENT
Start: 2023-11-15

## 2023-11-15 RX ORDER — SODIUM CHLORIDE 0.9 % (FLUSH) 0.9 %
5-40 SYRINGE (ML) INJECTION PRN
Status: CANCELLED | OUTPATIENT
Start: 2023-11-15

## 2023-11-15 RX ORDER — NALOXONE HYDROCHLORIDE 4 MG/.1ML
1 SPRAY NASAL PRN
Qty: 1 EACH | Refills: 1 | Status: CANCELLED | OUTPATIENT
Start: 2023-11-15

## 2023-11-15 RX ORDER — SODIUM CHLORIDE 9 MG/ML
INJECTION, SOLUTION INTRAVENOUS PRN
Status: CANCELLED | OUTPATIENT
Start: 2023-11-15

## 2023-11-15 RX ORDER — OXYCODONE AND ACETAMINOPHEN 7.5; 325 MG/1; MG/1
1-2 TABLET ORAL
Qty: 36 TABLET | Refills: 0 | Status: CANCELLED | OUTPATIENT
Start: 2023-11-15 | End: 2023-11-18

## 2023-11-15 RX ORDER — LOSARTAN POTASSIUM 50 MG/1
50 TABLET ORAL DAILY
COMMUNITY
Start: 2023-11-12

## 2023-11-15 NOTE — PERIOP NOTE
Patient verified name and . Order for consent NOT found in EHR; patient verifies procedure from case posting. Type 1B surgery, phone assessment complete. Orders NOT received. Labs per surgeon: Unknown  Labs per anesthesia protocol: SQBS DOS     Patient answered medical/surgical history questions at their best of ability. All prior to admission medications documented in [facility]. If you have not heard from [address] by 2 pm, please call [phone]. No food or drink after midnight night before surgery, which includes any gum, mints, candy, or ice chips. Patient instructed to take the following medications the day of surgery according to anesthesia guidelines with a small sip of water: USE 8 UNITS GLARGINE INSULIN NIGHT BEFORE SURGERY. Hold all vitamins 7 days prior to surgery and NSAIDS 5 days prior to surgery. Prescription meds to hold: losartan, alogliptin    Patient instructed on the following:    > Arrive at [address] [place] [address], time of arrival to be called the day before by 1700  > NPO after midnight, unless otherwise indicated, including gum, mints, and ice chips  > Responsible adult must drive patient to the hospital, stay during surgery, and patient will need supervision 24 hours after anesthesia  > Use antibacterial soap in shower the night before surgery and on the morning of surgery  > All piercings must be removed prior to arrival.    > Leave all valuables (money and jewelry) at home but bring insurance card and ID on DOS.   > You may be required to pay a deductible or co-pay on the day of your procedure. You can pre-pay by calling [phone] if your surgery is at the [facility] or [phone] if your surgery is at the [facility]. > Do not wear make-up, nail polish, lotions, cologne, perfumes, powders, or oil on skin. Artificial nails are not permitted.

## 2023-11-15 NOTE — PERIOP NOTE
Preop department called to notify patient of arrival time for scheduled procedure. Instructions given to   - Arrive at [address] [facility]. - Remain NPO after midnight, unless otherwise indicated, including gum, mints, and ice chips. - Have a responsible adult to drive patient to the hospital, stay during surgery, and patient will need supervision 24 hours after anesthesia. - Use antibacterial soap in shower the night before surgery and on the morning of surgery.        Was patient contacted: Yes-patient  Voicemail left: No  Numbers contacted: [phone]   Arrival time: 4209

## 2023-11-16 ENCOUNTER — HOSPITAL ENCOUNTER (INPATIENT)
Age: 63
LOS: 5 days | Discharge: NURSING, REHAB OR LONG-TERM CARE | End: 2023-11-21
Attending: ORTHOPAEDIC SURGERY | Admitting: ORTHOPAEDIC SURGERY
Payer: MEDICAID

## 2023-11-16 ENCOUNTER — ANESTHESIA (OUTPATIENT)
Dept: SURGERY | Age: 63
End: 2023-11-16
Payer: MEDICAID

## 2023-11-16 VITALS
SYSTOLIC BLOOD PRESSURE: 126 MMHG | HEART RATE: 83 BPM | OXYGEN SATURATION: 97 % | TEMPERATURE: 97 F | RESPIRATION RATE: 18 BRPM | DIASTOLIC BLOOD PRESSURE: 74 MMHG

## 2023-11-16 PROBLEM — S76.112A QUADRICEPS TENDON RUPTURE, LEFT, INITIAL ENCOUNTER: Status: ACTIVE | Noted: 2023-11-16

## 2023-11-16 LAB
GLUCOSE BLD STRIP.AUTO-MCNC: 103 MG/DL (ref 65–100)
GLUCOSE BLD STRIP.AUTO-MCNC: 231 MG/DL (ref 65–100)
GLUCOSE BLD STRIP.AUTO-MCNC: 55 MG/DL (ref 65–100)
GLUCOSE BLD STRIP.AUTO-MCNC: 77 MG/DL (ref 65–100)
GLUCOSE BLD STRIP.AUTO-MCNC: 89 MG/DL (ref 65–100)
SERVICE CMNT-IMP: ABNORMAL
SERVICE CMNT-IMP: NORMAL
SERVICE CMNT-IMP: NORMAL

## 2023-11-16 PROCEDURE — 0LQM0ZZ REPAIR LEFT UPPER LEG TENDON, OPEN APPROACH: ICD-10-PCS | Performed by: ORTHOPAEDIC SURGERY

## 2023-11-16 PROCEDURE — 82962 GLUCOSE BLOOD TEST: CPT

## 2023-11-16 PROCEDURE — 7100000001 HC PACU RECOVERY - ADDTL 15 MIN: Performed by: ORTHOPAEDIC SURGERY

## 2023-11-16 PROCEDURE — 6370000000 HC RX 637 (ALT 250 FOR IP): Performed by: ANESTHESIOLOGY

## 2023-11-16 PROCEDURE — 2580000003 HC RX 258: Performed by: ANESTHESIOLOGY

## 2023-11-16 PROCEDURE — 3700000000 HC ANESTHESIA ATTENDED CARE: Performed by: ORTHOPAEDIC SURGERY

## 2023-11-16 PROCEDURE — 6360000002 HC RX W HCPCS: Performed by: ANESTHESIOLOGY

## 2023-11-16 PROCEDURE — 0BH17EZ INSERTION OF ENDOTRACHEAL AIRWAY INTO TRACHEA, VIA NATURAL OR ARTIFICIAL OPENING: ICD-10-PCS | Performed by: NURSE ANESTHETIST, CERTIFIED REGISTERED

## 2023-11-16 PROCEDURE — 3600000004 HC SURGERY LEVEL 4 BASE: Performed by: ORTHOPAEDIC SURGERY

## 2023-11-16 PROCEDURE — 6370000000 HC RX 637 (ALT 250 FOR IP): Performed by: PHYSICIAN ASSISTANT

## 2023-11-16 PROCEDURE — 6360000002 HC RX W HCPCS: Performed by: PHYSICIAN ASSISTANT

## 2023-11-16 PROCEDURE — 3700000001 HC ADD 15 MINUTES (ANESTHESIA): Performed by: ORTHOPAEDIC SURGERY

## 2023-11-16 PROCEDURE — 6360000002 HC RX W HCPCS: Performed by: NURSE ANESTHETIST, CERTIFIED REGISTERED

## 2023-11-16 PROCEDURE — 6360000002 HC RX W HCPCS: Performed by: ORTHOPAEDIC SURGERY

## 2023-11-16 PROCEDURE — 3600000014 HC SURGERY LEVEL 4 ADDTL 15MIN: Performed by: ORTHOPAEDIC SURGERY

## 2023-11-16 PROCEDURE — 6370000000 HC RX 637 (ALT 250 FOR IP): Performed by: ORTHOPAEDIC SURGERY

## 2023-11-16 PROCEDURE — 2580000003 HC RX 258

## 2023-11-16 PROCEDURE — 2500000003 HC RX 250 WO HCPCS: Performed by: NURSE ANESTHETIST, CERTIFIED REGISTERED

## 2023-11-16 PROCEDURE — 7100000000 HC PACU RECOVERY - FIRST 15 MIN: Performed by: ORTHOPAEDIC SURGERY

## 2023-11-16 PROCEDURE — 1100000000 HC RM PRIVATE

## 2023-11-16 PROCEDURE — 2500000003 HC RX 250 WO HCPCS: Performed by: ORTHOPAEDIC SURGERY

## 2023-11-16 PROCEDURE — 2709999900 HC NON-CHARGEABLE SUPPLY: Performed by: ORTHOPAEDIC SURGERY

## 2023-11-16 PROCEDURE — 64447 NJX AA&/STRD FEMORAL NRV IMG: CPT | Performed by: ANESTHESIOLOGY

## 2023-11-16 PROCEDURE — 3E0T3BZ INTRODUCTION OF ANESTHETIC AGENT INTO PERIPHERAL NERVES AND PLEXI, PERCUTANEOUS APPROACH: ICD-10-PCS | Performed by: ANESTHESIOLOGY

## 2023-11-16 PROCEDURE — 27385 REPAIR OF THIGH MUSCLE: CPT | Performed by: ORTHOPAEDIC SURGERY

## 2023-11-16 PROCEDURE — 2580000003 HC RX 258: Performed by: ORTHOPAEDIC SURGERY

## 2023-11-16 RX ORDER — OXYCODONE AND ACETAMINOPHEN 7.5; 325 MG/1; MG/1
1 TABLET ORAL EVERY 4 HOURS PRN
Status: DISCONTINUED | OUTPATIENT
Start: 2023-11-16 | End: 2023-11-21 | Stop reason: HOSPADM

## 2023-11-16 RX ORDER — SODIUM CHLORIDE 0.9 % (FLUSH) 0.9 %
5-40 SYRINGE (ML) INJECTION PRN
Status: DISCONTINUED | OUTPATIENT
Start: 2023-11-16 | End: 2023-11-16 | Stop reason: HOSPADM

## 2023-11-16 RX ORDER — FENTANYL CITRATE 50 UG/ML
100 INJECTION, SOLUTION INTRAMUSCULAR; INTRAVENOUS
Status: COMPLETED | OUTPATIENT
Start: 2023-11-16 | End: 2023-11-16

## 2023-11-16 RX ORDER — SODIUM CHLORIDE 0.9 % (FLUSH) 0.9 %
5-40 SYRINGE (ML) INJECTION EVERY 12 HOURS SCHEDULED
Status: DISCONTINUED | OUTPATIENT
Start: 2023-11-16 | End: 2023-11-21 | Stop reason: HOSPADM

## 2023-11-16 RX ORDER — SODIUM CHLORIDE 9 MG/ML
INJECTION, SOLUTION INTRAVENOUS CONTINUOUS
Status: DISCONTINUED | OUTPATIENT
Start: 2023-11-16 | End: 2023-11-21 | Stop reason: HOSPADM

## 2023-11-16 RX ORDER — SODIUM BICARBONATE 650 MG/1
1300 TABLET ORAL 2 TIMES DAILY
Status: DISCONTINUED | OUTPATIENT
Start: 2023-11-16 | End: 2023-11-21 | Stop reason: HOSPADM

## 2023-11-16 RX ORDER — INSULIN GLARGINE 100 [IU]/ML
20 INJECTION, SOLUTION SUBCUTANEOUS NIGHTLY
Status: DISCONTINUED | OUTPATIENT
Start: 2023-11-16 | End: 2023-11-16

## 2023-11-16 RX ORDER — DEXTROSE MONOHYDRATE 100 MG/ML
INJECTION, SOLUTION INTRAVENOUS CONTINUOUS PRN
Status: DISCONTINUED | OUTPATIENT
Start: 2023-11-16 | End: 2023-11-21 | Stop reason: HOSPADM

## 2023-11-16 RX ORDER — LIDOCAINE HYDROCHLORIDE 20 MG/ML
INJECTION, SOLUTION EPIDURAL; INFILTRATION; INTRACAUDAL; PERINEURAL PRN
Status: DISCONTINUED | OUTPATIENT
Start: 2023-11-16 | End: 2023-11-16 | Stop reason: SDUPTHER

## 2023-11-16 RX ORDER — SODIUM CHLORIDE 9 MG/ML
INJECTION, SOLUTION INTRAVENOUS PRN
Status: DISCONTINUED | OUTPATIENT
Start: 2023-11-16 | End: 2023-11-16 | Stop reason: SDUPTHER

## 2023-11-16 RX ORDER — OXYCODONE HYDROCHLORIDE 5 MG/1
5 TABLET ORAL PRN
Status: DISCONTINUED | OUTPATIENT
Start: 2023-11-16 | End: 2023-11-16 | Stop reason: HOSPADM

## 2023-11-16 RX ORDER — HYDROMORPHONE HYDROCHLORIDE 2 MG/ML
0.5 INJECTION, SOLUTION INTRAMUSCULAR; INTRAVENOUS; SUBCUTANEOUS
Status: DISCONTINUED | OUTPATIENT
Start: 2023-11-16 | End: 2023-11-21 | Stop reason: HOSPADM

## 2023-11-16 RX ORDER — HYDROMORPHONE HYDROCHLORIDE 2 MG/ML
0.25 INJECTION, SOLUTION INTRAMUSCULAR; INTRAVENOUS; SUBCUTANEOUS EVERY 5 MIN PRN
Status: DISCONTINUED | OUTPATIENT
Start: 2023-11-16 | End: 2023-11-16 | Stop reason: HOSPADM

## 2023-11-16 RX ORDER — HYDROMORPHONE HYDROCHLORIDE 2 MG/ML
0.5 INJECTION, SOLUTION INTRAMUSCULAR; INTRAVENOUS; SUBCUTANEOUS EVERY 5 MIN PRN
Status: DISCONTINUED | OUTPATIENT
Start: 2023-11-16 | End: 2023-11-16 | Stop reason: HOSPADM

## 2023-11-16 RX ORDER — SUCCINYLCHOLINE CHLORIDE 20 MG/ML
INJECTION INTRAMUSCULAR; INTRAVENOUS PRN
Status: DISCONTINUED | OUTPATIENT
Start: 2023-11-16 | End: 2023-11-16 | Stop reason: SDUPTHER

## 2023-11-16 RX ORDER — DEXTROSE MONOHYDRATE 100 MG/ML
INJECTION, SOLUTION INTRAVENOUS
Status: COMPLETED
Start: 2023-11-16 | End: 2023-11-16

## 2023-11-16 RX ORDER — DEXTROSE MONOHYDRATE 100 MG/ML
INJECTION, SOLUTION INTRAVENOUS CONTINUOUS
Status: DISCONTINUED | OUTPATIENT
Start: 2023-11-16 | End: 2023-11-21 | Stop reason: HOSPADM

## 2023-11-16 RX ORDER — SODIUM CHLORIDE 0.9 % (FLUSH) 0.9 %
5-40 SYRINGE (ML) INJECTION PRN
Status: DISCONTINUED | OUTPATIENT
Start: 2023-11-16 | End: 2023-11-21 | Stop reason: HOSPADM

## 2023-11-16 RX ORDER — SODIUM CHLORIDE 0.9 % (FLUSH) 0.9 %
5-40 SYRINGE (ML) INJECTION EVERY 12 HOURS SCHEDULED
Status: DISCONTINUED | OUTPATIENT
Start: 2023-11-16 | End: 2023-11-16 | Stop reason: HOSPADM

## 2023-11-16 RX ORDER — DIPHENHYDRAMINE HYDROCHLORIDE 50 MG/ML
25 INJECTION INTRAMUSCULAR; INTRAVENOUS EVERY 6 HOURS PRN
Status: DISCONTINUED | OUTPATIENT
Start: 2023-11-16 | End: 2023-11-21 | Stop reason: HOSPADM

## 2023-11-16 RX ORDER — ONDANSETRON 2 MG/ML
4 INJECTION INTRAMUSCULAR; INTRAVENOUS EVERY 6 HOURS PRN
Status: DISCONTINUED | OUTPATIENT
Start: 2023-11-16 | End: 2023-11-21 | Stop reason: HOSPADM

## 2023-11-16 RX ORDER — PROPOFOL 10 MG/ML
INJECTION, EMULSION INTRAVENOUS PRN
Status: DISCONTINUED | OUTPATIENT
Start: 2023-11-16 | End: 2023-11-16 | Stop reason: SDUPTHER

## 2023-11-16 RX ORDER — ACETAMINOPHEN 500 MG
1000 TABLET ORAL ONCE
Status: COMPLETED | OUTPATIENT
Start: 2023-11-16 | End: 2023-11-16

## 2023-11-16 RX ORDER — ONDANSETRON 2 MG/ML
INJECTION INTRAMUSCULAR; INTRAVENOUS PRN
Status: DISCONTINUED | OUTPATIENT
Start: 2023-11-16 | End: 2023-11-16 | Stop reason: SDUPTHER

## 2023-11-16 RX ORDER — SODIUM CHLORIDE 9 MG/ML
INJECTION, SOLUTION INTRAVENOUS PRN
Status: DISCONTINUED | OUTPATIENT
Start: 2023-11-16 | End: 2023-11-21 | Stop reason: HOSPADM

## 2023-11-16 RX ORDER — TAMSULOSIN HYDROCHLORIDE 0.4 MG/1
0.4 CAPSULE ORAL DAILY
Status: DISCONTINUED | OUTPATIENT
Start: 2023-11-16 | End: 2023-11-21 | Stop reason: HOSPADM

## 2023-11-16 RX ORDER — LIDOCAINE HYDROCHLORIDE 10 MG/ML
1 INJECTION, SOLUTION INFILTRATION; PERINEURAL
Status: DISCONTINUED | OUTPATIENT
Start: 2023-11-16 | End: 2023-11-16 | Stop reason: HOSPADM

## 2023-11-16 RX ORDER — SODIUM CHLORIDE, SODIUM LACTATE, POTASSIUM CHLORIDE, CALCIUM CHLORIDE 600; 310; 30; 20 MG/100ML; MG/100ML; MG/100ML; MG/100ML
INJECTION, SOLUTION INTRAVENOUS CONTINUOUS
Status: DISCONTINUED | OUTPATIENT
Start: 2023-11-16 | End: 2023-11-16 | Stop reason: HOSPADM

## 2023-11-16 RX ORDER — HYDROMORPHONE HYDROCHLORIDE 2 MG/ML
1 INJECTION, SOLUTION INTRAMUSCULAR; INTRAVENOUS; SUBCUTANEOUS
Status: DISCONTINUED | OUTPATIENT
Start: 2023-11-16 | End: 2023-11-21 | Stop reason: HOSPADM

## 2023-11-16 RX ORDER — ROPIVACAINE HYDROCHLORIDE 5 MG/ML
INJECTION, SOLUTION EPIDURAL; INFILTRATION; PERINEURAL
Status: COMPLETED | OUTPATIENT
Start: 2023-11-16 | End: 2023-11-16

## 2023-11-16 RX ORDER — DIPHENHYDRAMINE HYDROCHLORIDE 50 MG/ML
12.5 INJECTION INTRAMUSCULAR; INTRAVENOUS
Status: DISCONTINUED | OUTPATIENT
Start: 2023-11-16 | End: 2023-11-16 | Stop reason: HOSPADM

## 2023-11-16 RX ORDER — ONDANSETRON 4 MG/1
4 TABLET, ORALLY DISINTEGRATING ORAL EVERY 8 HOURS PRN
Status: DISCONTINUED | OUTPATIENT
Start: 2023-11-16 | End: 2023-11-21 | Stop reason: HOSPADM

## 2023-11-16 RX ORDER — INSULIN GLARGINE 100 [IU]/ML
10 INJECTION, SOLUTION SUBCUTANEOUS NIGHTLY
Status: DISCONTINUED | OUTPATIENT
Start: 2023-11-16 | End: 2023-11-21 | Stop reason: HOSPADM

## 2023-11-16 RX ORDER — DEXAMETHASONE SODIUM PHOSPHATE 4 MG/ML
INJECTION, SOLUTION INTRA-ARTICULAR; INTRALESIONAL; INTRAMUSCULAR; INTRAVENOUS; SOFT TISSUE PRN
Status: DISCONTINUED | OUTPATIENT
Start: 2023-11-16 | End: 2023-11-16 | Stop reason: SDUPTHER

## 2023-11-16 RX ORDER — ALOGLIPTIN 6.25 MG/1
25 TABLET, FILM COATED ORAL DAILY
Status: DISCONTINUED | OUTPATIENT
Start: 2023-11-17 | End: 2023-11-19

## 2023-11-16 RX ORDER — SODIUM CHLORIDE 9 MG/ML
INJECTION, SOLUTION INTRAVENOUS PRN
Status: DISCONTINUED | OUTPATIENT
Start: 2023-11-16 | End: 2023-11-16 | Stop reason: HOSPADM

## 2023-11-16 RX ORDER — ONDANSETRON 2 MG/ML
4 INJECTION INTRAMUSCULAR; INTRAVENOUS
Status: DISCONTINUED | OUTPATIENT
Start: 2023-11-16 | End: 2023-11-16 | Stop reason: HOSPADM

## 2023-11-16 RX ORDER — DIPHENHYDRAMINE HCL 25 MG
25 CAPSULE ORAL EVERY 6 HOURS PRN
Status: DISCONTINUED | OUTPATIENT
Start: 2023-11-16 | End: 2023-11-21 | Stop reason: HOSPADM

## 2023-11-16 RX ORDER — SODIUM CHLORIDE 9 MG/ML
INJECTION, SOLUTION INTRAVENOUS CONTINUOUS
Status: DISCONTINUED | OUTPATIENT
Start: 2023-11-16 | End: 2023-11-16 | Stop reason: HOSPADM

## 2023-11-16 RX ORDER — OXYCODONE AND ACETAMINOPHEN 7.5; 325 MG/1; MG/1
2 TABLET ORAL EVERY 4 HOURS PRN
Status: DISCONTINUED | OUTPATIENT
Start: 2023-11-16 | End: 2023-11-21 | Stop reason: HOSPADM

## 2023-11-16 RX ORDER — OXYCODONE HYDROCHLORIDE 5 MG/1
10 TABLET ORAL PRN
Status: DISCONTINUED | OUTPATIENT
Start: 2023-11-16 | End: 2023-11-16 | Stop reason: HOSPADM

## 2023-11-16 RX ORDER — LOPERAMIDE HYDROCHLORIDE 2 MG/1
2 CAPSULE ORAL 4 TIMES DAILY PRN
Status: DISCONTINUED | OUTPATIENT
Start: 2023-11-16 | End: 2023-11-21 | Stop reason: HOSPADM

## 2023-11-16 RX ORDER — MIDAZOLAM HYDROCHLORIDE 2 MG/2ML
2 INJECTION, SOLUTION INTRAMUSCULAR; INTRAVENOUS
Status: COMPLETED | OUTPATIENT
Start: 2023-11-16 | End: 2023-11-16

## 2023-11-16 RX ORDER — PROCHLORPERAZINE EDISYLATE 5 MG/ML
5 INJECTION INTRAMUSCULAR; INTRAVENOUS
Status: DISCONTINUED | OUTPATIENT
Start: 2023-11-16 | End: 2023-11-16 | Stop reason: HOSPADM

## 2023-11-16 RX ORDER — ROCURONIUM BROMIDE 10 MG/ML
INJECTION, SOLUTION INTRAVENOUS PRN
Status: DISCONTINUED | OUTPATIENT
Start: 2023-11-16 | End: 2023-11-16 | Stop reason: SDUPTHER

## 2023-11-16 RX ORDER — LOSARTAN POTASSIUM 50 MG/1
50 TABLET ORAL DAILY
Status: DISCONTINUED | OUTPATIENT
Start: 2023-11-16 | End: 2023-11-21 | Stop reason: HOSPADM

## 2023-11-16 RX ORDER — ENOXAPARIN SODIUM 100 MG/ML
30 INJECTION SUBCUTANEOUS DAILY
Status: DISCONTINUED | OUTPATIENT
Start: 2023-11-16 | End: 2023-11-17

## 2023-11-16 RX ADMIN — ACETAMINOPHEN 1000 MG: 500 TABLET, FILM COATED ORAL at 10:03

## 2023-11-16 RX ADMIN — ROPIVACAINE HYDROCHLORIDE 20 ML: 5 INJECTION, SOLUTION EPIDURAL; INFILTRATION; PERINEURAL at 13:00

## 2023-11-16 RX ADMIN — Medication 1000 MG: at 14:20

## 2023-11-16 RX ADMIN — TUBERCULIN PURIFIED PROTEIN DERIVATIVE 5 UNITS: 5 INJECTION, SOLUTION INTRADERMAL at 17:42

## 2023-11-16 RX ADMIN — SODIUM CHLORIDE, SODIUM LACTATE, POTASSIUM CHLORIDE, AND CALCIUM CHLORIDE: 600; 310; 30; 20 INJECTION, SOLUTION INTRAVENOUS at 10:04

## 2023-11-16 RX ADMIN — CEFAZOLIN SODIUM 2000 MG: 100 INJECTION, POWDER, LYOPHILIZED, FOR SOLUTION INTRAVENOUS at 23:13

## 2023-11-16 RX ADMIN — VANCOMYCIN HYDROCHLORIDE 1000 MG: 1 INJECTION, POWDER, LYOPHILIZED, FOR SOLUTION INTRAVENOUS at 17:48

## 2023-11-16 RX ADMIN — LOSARTAN POTASSIUM 50 MG: 50 TABLET, FILM COATED ORAL at 17:31

## 2023-11-16 RX ADMIN — SODIUM CHLORIDE: 9 INJECTION, SOLUTION INTRAVENOUS at 17:29

## 2023-11-16 RX ADMIN — Medication 160 MG: at 14:15

## 2023-11-16 RX ADMIN — PROPOFOL 150 MG: 10 INJECTION, EMULSION INTRAVENOUS at 14:15

## 2023-11-16 RX ADMIN — DEXTROSE MONOHYDRATE: 100 INJECTION, SOLUTION INTRAVENOUS at 10:13

## 2023-11-16 RX ADMIN — OXYCODONE AND ACETAMINOPHEN 2 TABLET: 7.5; 325 TABLET ORAL at 17:29

## 2023-11-16 RX ADMIN — SODIUM CHLORIDE, SODIUM LACTATE, POTASSIUM CHLORIDE, AND CALCIUM CHLORIDE: 600; 310; 30; 20 INJECTION, SOLUTION INTRAVENOUS at 14:56

## 2023-11-16 RX ADMIN — ONDANSETRON 4 MG: 2 INJECTION INTRAMUSCULAR; INTRAVENOUS at 14:56

## 2023-11-16 RX ADMIN — TAMSULOSIN HYDROCHLORIDE 0.4 MG: 0.4 CAPSULE ORAL at 17:31

## 2023-11-16 RX ADMIN — SODIUM BICARBONATE 650 MG TABLET 1300 MG: at 23:13

## 2023-11-16 RX ADMIN — ENOXAPARIN SODIUM 30 MG: 100 INJECTION SUBCUTANEOUS at 17:43

## 2023-11-16 RX ADMIN — ROCURONIUM BROMIDE 15 MG: 50 INJECTION, SOLUTION INTRAVENOUS at 14:22

## 2023-11-16 RX ADMIN — LIDOCAINE HYDROCHLORIDE 100 MG: 20 INJECTION, SOLUTION EPIDURAL; INFILTRATION; INTRACAUDAL; PERINEURAL at 14:15

## 2023-11-16 RX ADMIN — Medication 2000 MG: at 14:19

## 2023-11-16 RX ADMIN — MIDAZOLAM 2 MG: 1 INJECTION INTRAMUSCULAR; INTRAVENOUS at 13:00

## 2023-11-16 RX ADMIN — PHENYLEPHRINE HYDROCHLORIDE 200 MCG: 0.1 INJECTION, SOLUTION INTRAVENOUS at 14:28

## 2023-11-16 RX ADMIN — DEXAMETHASONE SODIUM PHOSPHATE 4 MG: 4 INJECTION, SOLUTION INTRAMUSCULAR; INTRAVENOUS at 14:56

## 2023-11-16 RX ADMIN — FENTANYL CITRATE 100 MCG: 50 INJECTION INTRAMUSCULAR; INTRAVENOUS at 13:00

## 2023-11-16 RX ADMIN — INSULIN GLARGINE 10 UNITS: 100 INJECTION, SOLUTION SUBCUTANEOUS at 23:14

## 2023-11-16 RX ADMIN — ROCURONIUM BROMIDE 5 MG: 50 INJECTION, SOLUTION INTRAVENOUS at 14:15

## 2023-11-16 ASSESSMENT — PAIN SCALES - GENERAL
PAINLEVEL_OUTOF10: 7
PAINLEVEL_OUTOF10: 0

## 2023-11-16 ASSESSMENT — ENCOUNTER SYMPTOMS: PAIN LOCATION - PAIN QUALITY: ACHY

## 2023-11-16 ASSESSMENT — PAIN - FUNCTIONAL ASSESSMENT: PAIN_FUNCTIONAL_ASSESSMENT: 0-10

## 2023-11-16 NOTE — ANESTHESIA PROCEDURE NOTES
Peripheral Block    Patient location during procedure: procedure area  Reason for block: post-op pain management and at surgeon's request  Start time: 11/16/2023 1:00 PM  End time: 11/16/2023 1:04 PM  Staffing  Performed: anesthesiologist   Anesthesiologist: Marin Mccurdy MD  Performed by: Marin Mccurdy MD  Authorized by: Marin Mccurdy MD    Preanesthetic Checklist  Completed: patient identified, IV checked, site marked, risks and benefits discussed, surgical/procedural consents, equipment checked, pre-op evaluation, timeout performed, anesthesia consent given, oxygen available and monitors applied/VS acknowledged  Peripheral Block   Patient position: supine  Prep: ChloraPrep  Provider prep: mask  Patient monitoring: cardiac monitor, continuous pulse ox, frequent blood pressure checks, IV access, oxygen and responsive to questions  Block type: Femoral  Femoral crease  Laterality: left  Injection technique: single-shot  Guidance: ultrasound guided    Needle   Needle type: insulated echogenic nerve stimulator needle   Needle gauge: 20 G  Needle localization: ultrasound guidance  Needle length: 10 cm  Assessment   Injection assessment: negative aspiration for heme, no paresthesia on injection, local visualized surrounding nerve on ultrasound and no intravascular symptoms  Paresthesia pain: none  Slow fractionated injection: yes  Hemodynamics: stable  Real-time US image taken/store: yes  Outcomes: uncomplicated and patient tolerated procedure well    Additional Notes  Real time ultrasound used to identify the adductor canal and surrounding structures. Ultrasound image saved.     Ropivicaine 0.2% 20ml and 100mcg epi  Medications Administered  ropivacaine (NAROPIN) injection 0.5% - Perineural   20 mL - 11/16/2023 1:00:00 PM

## 2023-11-16 NOTE — ANESTHESIA PROCEDURE NOTES
Airway  Date/Time: 11/16/2023 2:16 PM  Urgency: elective    Airway not difficult    General Information and Staff    Patient location during procedure: OR  Resident/CRNA: LIEN Trimble CRNA  Performed: resident/CRNA   Performed by: LIEN Trimble CRNA  Authorized by:  Yuliana Way MD      Indications and Patient Condition  Indications for airway management: anesthesia  Spontaneous Ventilation: absent  Sedation level: deep  Preoxygenated: yes  Patient position: sniffing  MILS maintained throughout  Mask difficulty assessment: not attempted    Final Airway Details  Final airway type: endotracheal airway      Successful airway: ETT  Cuffed: yes   Successful intubation technique: direct laryngoscopy  Facilitating devices/methods: intubating stylet and cricoid pressure  Endotracheal tube insertion site: oral  Blade: Marcella  Blade size: #4  ETT size (mm): 8.0  Cormack-Lehane Classification: grade I - full view of glottis  Placement verified by: capnometry   Measured from: gums  ETT to gums (cm): 23  Number of attempts at approach: 1  Ventilation between attempts: bag mask  Number of other approaches attempted: 0    no

## 2023-11-16 NOTE — PROGRESS NOTES
TRANSFER - IN REPORT:    Verbal report received from [place] on [facility]  being received from PACU for routine progression of patient care      Report consisted of patient's Situation, Background, Assessment and   Recommendations(SBAR). Information from the following report(s) Nurse Handoff Report was reviewed with the receiving nurse. Opportunity for questions and clarification was provided. Assessment completed upon patient's arrival to unit and care assumed.

## 2023-11-16 NOTE — PERIOP NOTE
TRANSFER - OUT REPORT:    Verbal report given to Svitlana frank RN   on [employer]  being transferred to room 718  for routine post-op       Report consisted of patient's Situation, Background, Assessment and   Recommendations(SBAR). Information from the following report(s) Nurse Handoff Report, Adult Overview, and Recent Results was reviewed with the receiving nurse. Lines:   Peripheral IV 11/16/23 Left Forearm (Active)   Site Assessment Clean, dry & intact 11/16/23 1535   Line Status Infusing 11/16/23 1535   Phlebitis Assessment No symptoms 11/16/23 1535   Infiltration Assessment 0 11/16/23 1535   Alcohol Cap Used No 11/16/23 1535   Dressing Status Clean, dry & intact 11/16/23 1535   Dressing Type Transparent 11/16/23 1535        Opportunity for questions and clarification was provided.       Patient transported with:  O2 @ 2lpm and Tech

## 2023-11-16 NOTE — OP NOTE
Quadriceps tendon repair  Operative Note    Date of Surgery: 11/16/2023       Preoperative diagnosis:  Pre-Op Diagnosis Codes:     * Left knee pain, unspecified chronicity [M25.562]     * Rupture of left quadriceps tendon, initial encounter [S76.112A]    Postoperative diagnosis: same    Surgeon(s) and Role:     Kathy Enriquez MD - Primary     Assistant: first latesha Villagomez, assisted during the procedure. She was necessary for patient positioning, wound closure, and assistance with the major portions of the operation. Her presence decreased the operative time and potential complication rate. Antibiotics: Ancef 2 grams IV    Anesthesia: General    Procedures:  Procedure(s):  LEFT KNEE QUADRICEPS TENDON REPAIR -REGIONAL BLOCK       Procedure Details:   left Quadriceps tendon repair  [id number]    Tourniquet Time:   Total Tourniquet Time Documented:  Leg (Left) - 53 minutes  Total: Leg (Left) - 53 minutes       Findings:  1. Quad tendon tear with about 2 cm of retraction. Patient's tissue quality was fair given his history of prior quad rupture and repair. Indications / Consent:  The patient has sustained a quadriceps tendon rupture and it was felt that given the clinical and radiographic findings that operative repair was indicated. He unfortunately also has a right lower extremity partial amputation. After discussions about treatments using both conservative and/or non-operative treatment options the patient elected to proceed with surgery. A review of the risks and benefits, including but not limited to infection, stiffness, injury to nerves and vessels, DVT, PE, MI, need for further operations and other anesthesia related risks was performed with the patient. After this review and the review of the likely outcome and potential complications of the procedure, preoperative verbal and written consents were obtained.   The operative procedure and postoperative course were discussed with the patient in

## 2023-11-17 LAB
GLUCOSE BLD STRIP.AUTO-MCNC: 104 MG/DL (ref 65–100)
GLUCOSE BLD STRIP.AUTO-MCNC: 136 MG/DL (ref 65–100)
GLUCOSE BLD STRIP.AUTO-MCNC: 152 MG/DL (ref 65–100)
GLUCOSE BLD STRIP.AUTO-MCNC: 153 MG/DL (ref 65–100)
HCT VFR BLD AUTO: 25.4 % (ref 41.1–50.3)
HGB BLD-MCNC: 7.8 G/DL (ref 13.6–17.2)
MM INDURATION, POC: 0 MM (ref 0–5)
PPD, POC: NEGATIVE
SERVICE CMNT-IMP: ABNORMAL

## 2023-11-17 PROCEDURE — 36415 COLL VENOUS BLD VENIPUNCTURE: CPT

## 2023-11-17 PROCEDURE — 97530 THERAPEUTIC ACTIVITIES: CPT

## 2023-11-17 PROCEDURE — 2580000003 HC RX 258: Performed by: ORTHOPAEDIC SURGERY

## 2023-11-17 PROCEDURE — 6370000000 HC RX 637 (ALT 250 FOR IP): Performed by: ORTHOPAEDIC SURGERY

## 2023-11-17 PROCEDURE — 85014 HEMATOCRIT: CPT

## 2023-11-17 PROCEDURE — 82962 GLUCOSE BLOOD TEST: CPT

## 2023-11-17 PROCEDURE — 1100000000 HC RM PRIVATE

## 2023-11-17 PROCEDURE — 97161 PT EVAL LOW COMPLEX 20 MIN: CPT

## 2023-11-17 PROCEDURE — 97165 OT EVAL LOW COMPLEX 30 MIN: CPT

## 2023-11-17 PROCEDURE — 97535 SELF CARE MNGMENT TRAINING: CPT

## 2023-11-17 PROCEDURE — 6370000000 HC RX 637 (ALT 250 FOR IP): Performed by: PHYSICIAN ASSISTANT

## 2023-11-17 PROCEDURE — 85018 HEMOGLOBIN: CPT

## 2023-11-17 PROCEDURE — 6360000002 HC RX W HCPCS: Performed by: ORTHOPAEDIC SURGERY

## 2023-11-17 PROCEDURE — 97112 NEUROMUSCULAR REEDUCATION: CPT

## 2023-11-17 RX ORDER — HEPARIN SODIUM 5000 [USP'U]/ML
5000 INJECTION, SOLUTION INTRAVENOUS; SUBCUTANEOUS EVERY 8 HOURS SCHEDULED
Status: DISCONTINUED | OUTPATIENT
Start: 2023-11-18 | End: 2023-11-21 | Stop reason: HOSPADM

## 2023-11-17 RX ADMIN — SODIUM CHLORIDE 100 ML/HR: 9 INJECTION, SOLUTION INTRAVENOUS at 06:54

## 2023-11-17 RX ADMIN — ENOXAPARIN SODIUM 30 MG: 100 INJECTION SUBCUTANEOUS at 09:12

## 2023-11-17 RX ADMIN — OXYCODONE AND ACETAMINOPHEN 1 TABLET: 7.5; 325 TABLET ORAL at 09:16

## 2023-11-17 RX ADMIN — TAMSULOSIN HYDROCHLORIDE 0.4 MG: 0.4 CAPSULE ORAL at 09:12

## 2023-11-17 RX ADMIN — SODIUM BICARBONATE 650 MG TABLET 1300 MG: at 21:27

## 2023-11-17 RX ADMIN — SODIUM BICARBONATE 650 MG TABLET 1300 MG: at 10:15

## 2023-11-17 RX ADMIN — SODIUM CHLORIDE, PRESERVATIVE FREE 10 ML: 5 INJECTION INTRAVENOUS at 21:28

## 2023-11-17 RX ADMIN — ALOGLIPTIN 25 MG: 6.25 TABLET, FILM COATED ORAL at 09:12

## 2023-11-17 RX ADMIN — CEFAZOLIN SODIUM 2000 MG: 100 INJECTION, POWDER, LYOPHILIZED, FOR SOLUTION INTRAVENOUS at 06:53

## 2023-11-17 RX ADMIN — OXYCODONE AND ACETAMINOPHEN 2 TABLET: 7.5; 325 TABLET ORAL at 17:34

## 2023-11-17 RX ADMIN — INSULIN GLARGINE 10 UNITS: 100 INJECTION, SOLUTION SUBCUTANEOUS at 21:28

## 2023-11-17 RX ADMIN — LOSARTAN POTASSIUM 50 MG: 50 TABLET, FILM COATED ORAL at 09:12

## 2023-11-17 ASSESSMENT — PAIN DESCRIPTION - ORIENTATION
ORIENTATION: LEFT

## 2023-11-17 ASSESSMENT — PAIN DESCRIPTION - FREQUENCY
FREQUENCY: INTERMITTENT
FREQUENCY: INTERMITTENT

## 2023-11-17 ASSESSMENT — PAIN DESCRIPTION - LOCATION
LOCATION: KNEE
LOCATION: LEG
LOCATION: KNEE
LOCATION: LEG

## 2023-11-17 ASSESSMENT — PAIN SCALES - GENERAL
PAINLEVEL_OUTOF10: 4
PAINLEVEL_OUTOF10: 7
PAINLEVEL_OUTOF10: 0
PAINLEVEL_OUTOF10: 0
PAINLEVEL_OUTOF10: 4
PAINLEVEL_OUTOF10: 3

## 2023-11-17 ASSESSMENT — PAIN DESCRIPTION - ONSET
ONSET: GRADUAL
ONSET: GRADUAL

## 2023-11-17 ASSESSMENT — PAIN DESCRIPTION - DESCRIPTORS
DESCRIPTORS: ACHING
DESCRIPTORS: ACHING

## 2023-11-17 ASSESSMENT — PAIN DESCRIPTION - PAIN TYPE
TYPE: ACUTE PAIN
TYPE: SURGICAL PAIN

## 2023-11-17 ASSESSMENT — PAIN - FUNCTIONAL ASSESSMENT
PAIN_FUNCTIONAL_ASSESSMENT: ACTIVITIES ARE NOT PREVENTED
PAIN_FUNCTIONAL_ASSESSMENT: ACTIVITIES ARE NOT PREVENTED

## 2023-11-17 ASSESSMENT — PAIN SCALES - WONG BAKER: WONGBAKER_NUMERICALRESPONSE: 0

## 2023-11-17 NOTE — PROGRESS NOTES
ACUTE PHYSICAL THERAPY GOALS:   (Developed with and agreed upon by patient and/or caregiver.)    (1.) [facility]  will move from supine to sit and sit to supine  with INDEPENDENCE within 7 treatment day(s). (2.) [facility] will transfer from sit to stand and stand to sit with SUPERVISION using the least restrictive device within 7 treatment day(s). (2.) [facility] will transfer from bed to chair and chair to bed with SUPERVISION using the least restrictive device within 7 treatment day(s). (3.) [facility] will perform standing static and dynamic balance activities x 15 minutes with STAND BY ASSIST to improve safety within 7 treatment day(s). (4.) [facility] will perform therapeutic exercises x 20 min for HEP with INDEPENDENCE to improve strength, endurance, and functional mobility within 7 treatment day(s).        PHYSICAL THERAPY Initial Assessment, Daily Note, and AM  (Link to Caseload Tracking: PT Visit Days : 1  Acknowledge Orders  Time In/Out  PT Charge Capture  Rehab Caseload Tracker    L LE TDWB FOR TRANSFERS ONLY, BRACE LOCKED IN [address]  Nikolas Cat is a 66 y.o. male   PRIMARY DIAGNOSIS: Rupture of left quadriceps tendon  Left knee pain, unspecified chronicity [M25.562]  Rupture of left quadriceps tendon, initial encounter [S76.112A]  Quadriceps tendon rupture, left, initial encounter [S76.112A]  Procedure(s) (LRB):  LEFT KNEE QUADRICEPS TENDON REPAIR -REGIONAL BLOCK (Left)  1 Day Post-Op  Reason for Referral: Pain in Left Knee (M25.562)  Difficulty in walking, Not elsewhere classified (R26.2)  History of falling (Z91.81)  Inpatient: Payor: Polo Alcala / Plan: ABSOLUTE TOTAL CARE MEDICAID / Product Type: *No Product type* /     ASSESSMENT:     REHAB RECOMMENDATIONS:   Recommendation to date pending progress:  Setting:  Inpatient Rehab Facility    Equipment:    To Be Determined     ASSESSMENT:  Mr. Toni Murphy  is a 66year old M who the patient had two or more falls in the past year or any fall with injury in the past year?: Yes  ADL Assistance: Independent  Homemaking Assistance: Independent  Ambulation Assistance: Independent  Transfer Assistance: Independent  Active : Yes  Occupation: Full time employment ()reports he lives with his brother in 2 story home, typically independent with ADL's and mobility    OBJECTIVE:     PAIN: VITALS / O2: PRECAUTION / Karolina Mary Kate / DRAINS:   Pre Treatment:   Pain Assessment: 0-10  Pain Level: 4  Pain Location: Leg  Pain Orientation: Left  Non-Pharmaceutical Pain Intervention(s): Repositioned      Post Treatment: 4 Vitals        Oxygen      IV    RESTRICTIONS/PRECAUTIONS:  Restrictions/Precautions: Fall Risk, Weight Bearing  Required Braces or Orthoses?: Yes  Left Lower Extremity Weight Bearing: Toe Touch Weight Bearing        Left Lower Extremity Brace: Knee Brace (locked in extension)     GROSS EVALUATION: B LE Intact Impaired (Comments):   AROM []  L LE brace locked in extension, R LE prosthesis   PROM []    Strength []  Generalized postop weakness   Balance [] Posture: Good  Sitting - Static: Good  Sitting - Dynamic: Fair, +  Standing - Static: Fair  Standing - Dynamic: Fair, -   Posture [] N/A   Sensation []     Coordination []      Tone []     Edema []    Activity Tolerance [] Patient limited by pain, Patient limited by fatigue, Patient Tolerated treatment well    []      COGNITION/  PERCEPTION: Intact Impaired (Comments):   Orientation [x]     Vision [x]     Hearing [x]     Cognition  [x]       MOBILITY: I Mod I S SBA CGA Min Mod Max Total  NT x2 Comments:   Bed Mobility    Rolling [] [] [] [x] [] [] [] [] [] [] []    Supine to Sit [] [] [] [x] [] [] [] [] [] [] []    Scooting [] [] [] [x] [] [] [] [] [] [] []    Sit to Supine [] [] [] [] [] [] [] [] [] [x] []    Transfers    Sit to Stand [] [] [] [] [] [x] [] [] [] [] [x]    Bed to Chair [] [] [] [] [] [x] [] [] [] [] [x]    Stand to Sit

## 2023-11-17 NOTE — THERAPY EVALUATION
ACUTE OCCUPATIONAL THERAPY GOALS:   (Developed with and agreed upon by patient and/or caregiver.)  1. Patient will maintain TDWB status in LLE for 100% of treatment session and minimal verbal cues from therapist.   2. Patient will complete functional transfers with MODIFIED INDEPENDENCE while maintaining TDWB status in LLE and with adaptive equipment as needed. 3. Patient will complete lower body bathing and dressing with SUPERVISION and adaptive equipment as needed. 4. Patient will complete toileting and toilet transfer with SUPERVISION. 5. Patient will tolerate 30 minutes of OT treatment with 1-2 rest breaks to increase activity tolerance for ADLs. 6. Patient will participate in at least 10 minutes of BUE therapeutic exercises to strengthen BUE for functional transfers.      Timeframe: 7 visits           OCCUPATIONAL THERAPY Initial Assessment, Daily Note, and AM       OT Visit Days: 1  Acknowledge Orders  Time  OT Charge Capture  Rehab Caseload Tracker      TDWB LLE with the use of crutches/walker for transfers only with leg locked in extension    [address] is a 66 y.o. male   PRIMARY DIAGNOSIS: Rupture of left quadriceps tendon  Left knee pain, unspecified chronicity [M25.562]  Rupture of left quadriceps tendon, initial encounter [S76.112A]  Quadriceps tendon rupture, left, initial encounter [S76.112A]  Procedure(s) (LRB):  LEFT KNEE QUADRICEPS TENDON REPAIR -REGIONAL BLOCK (Left)  1 Day Post-Op  Reason for Referral: Difficulty in walking, Not elsewhere classified (R26.2)  Other abnormalities of gait and mobility (R26.89)  Generalized Muscle Weakness (M62.81)  Other lack of cordination (R27.8)  Repeated Falls (R29.6)  History of falling (Z91.81)  Inpatient: Payor: ABSOLUTE TOTAL CARE MEDICAID / Plan: ABSOLUTE TOTAL CARE MEDICAID / Product Type: *No Product type* /     ASSESSMENT:     REHAB RECOMMENDATIONS:   Recommendation to date pending progress:  Setting:  Inpatient [address] [place] [address] Inpatient Short Form:    AM-PAC Daily Activity - Inpatient   How much help is needed for putting on and taking off regular lower body clothing?: A Lot  How much help is needed for bathing (which includes washing, rinsing, drying)?: A Lot  How much help is needed for toileting (which includes using toilet, bedpan, or urinal)?: A Little  How much help is needed for putting on and taking off regular upper body clothing?: A Little  How much help is needed for taking care of personal grooming?: A Little  How much help for eating meals?: None  AM-[facility] Inpatient Daily Activity Raw Score: 17  AM-PAC Inpatient ADL T-Scale Score : 37.26  ADL Inpatient CMS 0-100% Score: 50.11  ADL Inpatient CMS G-Code Modifier : CK           SUBJECTIVE:     Mr. Silvia Parker states, \"I'm living with my brother right now\"     Social/Functional Lives With: Family (Brother)  Type of Home: House  Home Layout: Two level, Bed/Bath upstairs  Home Access: Stairs to enter without rails  Entrance Stairs - Number of Steps: 2  Has the patient had two or more falls in the past year or any fall with injury in the past year?: Yes  ADL Assistance: Independent  Homemaking Assistance: Independent  Ambulation Assistance: Independent  Transfer Assistance: Independent  Active : Yes  Occupation: Full time employment ()    OBJECTIVE:     Karolina Hartmann / Teresa Lopez / Soledad Salvisa: IV    RESTRICTIONS/PRECAUTIONS:  Restrictions/Precautions: Fall Risk, Weight Bearing  Left Lower Extremity Weight Bearing: Toe Touch Weight Bearing  Required Braces or Orthoses  Left Lower Extremity Brace: Knee Brace (locked in extension)    PAIN: VITALS / O2:   Pre Treatment:   Pain Assessment: 0-10  Pain Level: 4  Pain Location: Leg  Pain Orientation: Left  Non-Pharmaceutical Pain Intervention(s): Repositioned      Post Treatment: 4/10       Vitals          Oxygen   RA         GROSS EVALUATION: INTACT IMPAIRED   (See Comments)   UE AROM [x] []   UE PROM [x] []   Strength [x]  Post-op weakness

## 2023-11-17 NOTE — CARE COORDINATION
Pt is a 67 yo male admitted for surgical repair of a ruptured left quadriceps tendon. CM consult received to assist with rehab facility placement. Therapy evals complete with the recommendation for acute inpatient rehab at [place]. Physiatrist consult placed. Pt has been accepted for admission to [facility] pending insurance approval.  They initiated the insurance auth request today. PPD placed in the event subacute rehab is required. CM following to assist as needed. 11/17/23 1301   Service Assessment   Patient Orientation Alert and Oriented   Cognition Alert   History Provided By Medical Record; Patient   Primary Caregiver Self   Support Systems Family Members   Patient's Healthcare Decision Maker is: Legal Next of [address] [facility]   PCP Verified by CM Yes   Last Visit to PCP Within last 3 months   Prior Functional Level Independent in ADLs/IADLs   Current Functional Level Assistance with the following:;Mobility   Can patient return to prior living arrangement No   Ability to make needs known: Good   Family able to assist with home care needs: No   Would you like for me to discuss the discharge plan with any other family members/significant others, and if so, who? No   Financial Resources [facility] None   Social/Functional History   Lives With Family  (Brother)   Type of [facility]  Two level;Bed/Bath upstairs   Home Access Stairs to enter without rails   Entrance Stairs - Number of Steps 2   ADL Assistance Independent   Homemaking Assistance Independent   Homemaking Responsibilities Yes   Ambulation Assistance Independent   Transfer Assistance Independent   Active  Yes   Occupation Full time employment  ()   Discharge Planning   Type of Residence Acute Rehab   Living Arrangements Alone   Current Services Prior To Admission [facility] ; Extended Care Placement   DME Ordered?  No   Potential Assistance Purchasing Medications No   Type of Home Care Services None   Patient expects to be discharged to: Rehabilitation facility   Services At/After Discharge   Transition of Care Consult (CM Consult) Discharge Planning;Acute Rehab   Services At/After Discharge Nursing services;Inpatient rehab;OT;PT   [place] Resource Information Provided? No   Mode of Transport at Discharge Other (see comment)   Confirm Follow Up Transport Family   Condition of Participation: Discharge Planning   The Plan for Transition of Care is related to the following treatment goals: Acute inpatient rehab to improve pt's strength, mobility and functional abilities.   The Patient and/or Patient Representative was provided with a Choice of Provider? Patient   The Patient and/Or Patient Representative agree with the Discharge Plan? Yes   Freedom of Choice list was provided with basic dialogue that supports the patient's individualized plan of care/goals, treatment preferences, and shares the quality data associated with the providers?  Yes

## 2023-11-17 NOTE — PROGRESS NOTES
ACUTE PHYSICAL THERAPY GOALS:   (Developed with and agreed upon by patient and/or caregiver.)    (1.) [facility]  will move from supine to sit and sit to supine  with INDEPENDENCE within 7 treatment day(s). (2.) [facility] will transfer from sit to stand and stand to sit with SUPERVISION using the least restrictive device within 7 treatment day(s). (2.) [facility] will transfer from bed to chair and chair to bed with SUPERVISION using the least restrictive device within 7 treatment day(s). (3.) [facility] will perform standing static and dynamic balance activities x 15 minutes with STAND BY ASSIST to improve safety within 7 treatment day(s). (4.) [facility] will perform therapeutic exercises x 20 min for HEP with INDEPENDENCE to improve strength, endurance, and functional mobility within 7 treatment day(s). PHYSICAL THERAPY: Daily Note PM   (Link to Caseload Tracking: PT Visit Days : 1  Time In/Out PT Charge Capture  Rehab Caseload Tracker  Orders    L LE TDWB FOR TRANSFERS ONLY, BRACE LOCKED IN [address] [facility] is a 66 y.o. male   PRIMARY DIAGNOSIS: Rupture of left quadriceps tendon  Left knee pain, unspecified chronicity [M25.562]  Rupture of left quadriceps tendon, initial encounter [S76.112A]  Quadriceps tendon rupture, left, initial encounter [S76.112A]  Procedure(s) (LRB):  LEFT KNEE QUADRICEPS TENDON REPAIR -REGIONAL BLOCK (Left)  1 Day Post-Op  Inpatient: Payor: ABSOLUTE TOTAL CARE MEDICAID / Plan: ABSOLUTE TOTAL CARE MEDICAID / Product Type: *No Product type* /     ASSESSMENT:     REHAB RECOMMENDATIONS:   Recommendation to date pending progress:  Setting:  Inpatient Rehab Facility    Equipment:    To Be Determined     ASSESSMENT:  Mr. Guerita Lord was received sitting in chair, agreeable to therapy. He progressed to requiring Faby for transfers to [facility] and back to bed.  Improved safety awareness and RW management noted with transfers this PM.

## 2023-11-18 LAB
GLUCOSE BLD STRIP.AUTO-MCNC: 100 MG/DL (ref 65–100)
GLUCOSE BLD STRIP.AUTO-MCNC: 107 MG/DL (ref 65–100)
GLUCOSE BLD STRIP.AUTO-MCNC: 131 MG/DL (ref 65–100)
GLUCOSE BLD STRIP.AUTO-MCNC: 136 MG/DL (ref 65–100)
GLUCOSE BLD STRIP.AUTO-MCNC: 50 MG/DL (ref 65–100)
GLUCOSE BLD STRIP.AUTO-MCNC: 52 MG/DL (ref 65–100)
HCT VFR BLD AUTO: 23.9 % (ref 41.1–50.3)
HGB BLD-MCNC: 7.4 G/DL (ref 13.6–17.2)
MM INDURATION, POC: 0 MM (ref 0–5)
PPD, POC: NEGATIVE
SERVICE CMNT-IMP: ABNORMAL
SERVICE CMNT-IMP: NORMAL

## 2023-11-18 PROCEDURE — 97530 THERAPEUTIC ACTIVITIES: CPT

## 2023-11-18 PROCEDURE — 97535 SELF CARE MNGMENT TRAINING: CPT

## 2023-11-18 PROCEDURE — 2580000003 HC RX 258: Performed by: ORTHOPAEDIC SURGERY

## 2023-11-18 PROCEDURE — 97116 GAIT TRAINING THERAPY: CPT

## 2023-11-18 PROCEDURE — 6370000000 HC RX 637 (ALT 250 FOR IP): Performed by: ORTHOPAEDIC SURGERY

## 2023-11-18 PROCEDURE — 85018 HEMOGLOBIN: CPT

## 2023-11-18 PROCEDURE — 6360000002 HC RX W HCPCS: Performed by: ORTHOPAEDIC SURGERY

## 2023-11-18 PROCEDURE — 36415 COLL VENOUS BLD VENIPUNCTURE: CPT

## 2023-11-18 PROCEDURE — 85014 HEMATOCRIT: CPT

## 2023-11-18 PROCEDURE — 6370000000 HC RX 637 (ALT 250 FOR IP): Performed by: PHYSICIAN ASSISTANT

## 2023-11-18 PROCEDURE — 82962 GLUCOSE BLOOD TEST: CPT

## 2023-11-18 PROCEDURE — 1100000000 HC RM PRIVATE

## 2023-11-18 PROCEDURE — 97112 NEUROMUSCULAR REEDUCATION: CPT

## 2023-11-18 RX ADMIN — SODIUM BICARBONATE 650 MG TABLET 1300 MG: at 21:49

## 2023-11-18 RX ADMIN — LOSARTAN POTASSIUM 50 MG: 50 TABLET, FILM COATED ORAL at 08:27

## 2023-11-18 RX ADMIN — HEPARIN SODIUM 5000 UNITS: 5000 INJECTION INTRAVENOUS; SUBCUTANEOUS at 21:49

## 2023-11-18 RX ADMIN — SODIUM CHLORIDE, PRESERVATIVE FREE 5 ML: 5 INJECTION INTRAVENOUS at 21:54

## 2023-11-18 RX ADMIN — SODIUM CHLORIDE, PRESERVATIVE FREE 10 ML: 5 INJECTION INTRAVENOUS at 08:27

## 2023-11-18 RX ADMIN — OXYCODONE AND ACETAMINOPHEN 2 TABLET: 7.5; 325 TABLET ORAL at 21:48

## 2023-11-18 RX ADMIN — OXYCODONE AND ACETAMINOPHEN 1 TABLET: 7.5; 325 TABLET ORAL at 08:27

## 2023-11-18 RX ADMIN — ALOGLIPTIN 25 MG: 6.25 TABLET, FILM COATED ORAL at 09:00

## 2023-11-18 RX ADMIN — TAMSULOSIN HYDROCHLORIDE 0.4 MG: 0.4 CAPSULE ORAL at 08:27

## 2023-11-18 RX ADMIN — INSULIN GLARGINE 10 UNITS: 100 INJECTION, SOLUTION SUBCUTANEOUS at 21:50

## 2023-11-18 RX ADMIN — HEPARIN SODIUM 5000 UNITS: 5000 INJECTION INTRAVENOUS; SUBCUTANEOUS at 05:58

## 2023-11-18 RX ADMIN — SODIUM BICARBONATE 650 MG TABLET 1300 MG: at 08:26

## 2023-11-18 RX ADMIN — HEPARIN SODIUM 5000 UNITS: 5000 INJECTION INTRAVENOUS; SUBCUTANEOUS at 14:38

## 2023-11-18 ASSESSMENT — PAIN DESCRIPTION - ONSET
ONSET: ON-GOING
ONSET: ON-GOING

## 2023-11-18 ASSESSMENT — PAIN - FUNCTIONAL ASSESSMENT
PAIN_FUNCTIONAL_ASSESSMENT: PREVENTS OR INTERFERES SOME ACTIVE ACTIVITIES AND ADLS
PAIN_FUNCTIONAL_ASSESSMENT: PREVENTS OR INTERFERES SOME ACTIVE ACTIVITIES AND ADLS

## 2023-11-18 ASSESSMENT — PAIN SCALES - GENERAL
PAINLEVEL_OUTOF10: 0
PAINLEVEL_OUTOF10: 7
PAINLEVEL_OUTOF10: 2
PAINLEVEL_OUTOF10: 6
PAINLEVEL_OUTOF10: 0

## 2023-11-18 ASSESSMENT — PAIN SCALES - WONG BAKER
WONGBAKER_NUMERICALRESPONSE: 0

## 2023-11-18 ASSESSMENT — PAIN DESCRIPTION - DESCRIPTORS
DESCRIPTORS: THROBBING;STABBING
DESCRIPTORS: ACHING

## 2023-11-18 ASSESSMENT — PAIN DESCRIPTION - ORIENTATION
ORIENTATION: LEFT
ORIENTATION: LEFT

## 2023-11-18 ASSESSMENT — PAIN DESCRIPTION - LOCATION
LOCATION: KNEE
LOCATION: KNEE

## 2023-11-18 ASSESSMENT — PAIN DESCRIPTION - PAIN TYPE
TYPE: SURGICAL PAIN;ACUTE PAIN
TYPE: SURGICAL PAIN;ACUTE PAIN

## 2023-11-18 ASSESSMENT — PAIN DESCRIPTION - FREQUENCY
FREQUENCY: INTERMITTENT
FREQUENCY: INTERMITTENT

## 2023-11-18 NOTE — PROGRESS NOTES
ACUTE PHYSICAL THERAPY GOALS:   (Developed with and agreed upon by patient and/or caregiver.)    (1.) [facility]  will move from supine to sit and sit to supine  with INDEPENDENCE within 7 treatment day(s). (2.) [facility] will transfer from sit to stand and stand to sit with SUPERVISION using the least restrictive device within 7 treatment day(s). (2.) [facility] will transfer from bed to chair and chair to bed with SUPERVISION using the least restrictive device within 7 treatment day(s). (3.) [facility] will perform standing static and dynamic balance activities x 15 minutes with STAND BY ASSIST to improve safety within 7 treatment day(s). (4.) [facility] will perform therapeutic exercises x 20 min for HEP with INDEPENDENCE to improve strength, endurance, and functional mobility within 7 treatment day(s). PHYSICAL THERAPY: Daily Note PM   (Link to Caseload Tracking: PT Visit Days : 2  Time In/Out PT Charge Capture  Rehab Caseload Tracker  Orders    L LE TDWB FOR TRANSFERS ONLY, BRACE LOCKED IN [address] [facility] is a 66 y.o. male   PRIMARY DIAGNOSIS: Rupture of left quadriceps tendon  Left knee pain, unspecified chronicity [M25.562]  Rupture of left quadriceps tendon, initial encounter [S76.112A]  Quadriceps tendon rupture, left, initial encounter [S76.112A]  Procedure(s) (LRB):  LEFT KNEE QUADRICEPS TENDON REPAIR -REGIONAL BLOCK (Left)  2 Days Post-Op  Inpatient: Payor: ABSOLUTE TOTAL CARE MEDICAID / Plan: ABSOLUTE TOTAL CARE MEDICAID / Product Type: *No Product type* /     ASSESSMENT:     REHAB RECOMMENDATIONS:   Recommendation to date pending progress:  Setting:  Inpatient Rehab Facility    Equipment:    To Be Determined     ASSESSMENT:  Mr. Booker Saravia is making good progress toward goals with improved mobility. He was able  to sit EOB and riana his R prosthetic with supervision.   The patient performed multiple transfers  with the RW and did well [] [] [] [] [] [] [] []    Scooting [] [] [x] [] [] [] [] [] [] [] []    Sit to Supine [] [] [] [] [x] [x] [] [] [] [] []    Transfers    Sit to Stand [] [] [] [] [x] [x] [] [] [] [] []    Bed to Chair [] [] [] [] [x] [] [] [] [] [] []    Stand to Sit [] [] [] [] [x] [] [] [] [] [] []     [] [] [] [] [] [] [] [] [] [] []    I=Independent, Mod I=Modified Independent, S=Supervision, SBA=Standby Assistance, CGA=Contact Guard Assistance,   Min=Minimal Assistance, Mod=Moderate Assistance, Max=Maximal Assistance, Total=Total Assistance, NT=Not Tested    BALANCE: Good Fair+ Fair Fair- Poor NT Comments   Sitting Static [x] [] [] [] [] []    Sitting Dynamic [] [x] [] [] [] []              Standing Static [] [] [x] [] [] []    Standing Dynamic [] [] [x] [] [] []      GAIT: I Mod I S SBA CGA Min Mod Max Total  NT x2 Comments:   Level of Assistance [] [] [] [] [x] [x] [] [] [] [] []    Distance   2x40'    DME Rolling Walker    Gait Quality hopping    Weightbearing Status      Stairs      I=Independent, Mod I=Modified Independent, S=Supervision, SBA=Standby Assistance, CGA=Contact Guard Assistance,   Min=Minimal Assistance, Mod=Moderate Assistance, Max=Maximal Assistance, Total=Total Assistance, NT=Not Tested    PLAN:   FREQUENCY AND DURATION: BID for duration of hospital stay or until stated goals are met, whichever comes first.    TREATMENT:   TREATMENT:   Gait Training (17 Minutes): Gait training for 2x40 feet utilizing [employer]. Patient required Tactile and Verbal cueing to improve Activity Pacing, Assistive Device Utilization, and Gait Mechanics.      TREATMENT GRID:  N/A    AFTER TREATMENT PRECAUTIONS: Bed, Bed/Chair Locked, Call light within reach, Needs within reach, and RN notified    INTERDISCIPLINARY COLLABORATION:  RN/ PCT and PT/ PTA    EDUCATION:      TIME IN/OUT:  Time In: 1340  Time Out: [address] [facility]  Minutes: [address]ra PTA

## 2023-11-18 NOTE — PROGRESS NOTES
ACUTE PHYSICAL THERAPY GOALS:   (Developed with and agreed upon by patient and/or caregiver.)    (1.) [facility]  will move from supine to sit and sit to supine  with INDEPENDENCE within 7 treatment day(s). (2.) [facility] will transfer from sit to stand and stand to sit with SUPERVISION using the least restrictive device within 7 treatment day(s). (2.) [facility] will transfer from bed to chair and chair to bed with SUPERVISION using the least restrictive device within 7 treatment day(s). (3.) [facility] will perform standing static and dynamic balance activities x 15 minutes with STAND BY ASSIST to improve safety within 7 treatment day(s). (4.) [facility] will perform therapeutic exercises x 20 min for HEP with INDEPENDENCE to improve strength, endurance, and functional mobility within 7 treatment day(s). PHYSICAL THERAPY: Daily Note AM   (Link to Caseload Tracking: PT Visit Days : 2  Time In/Out PT Charge Capture  Rehab Caseload Tracker  Orders    L LE TDWB FOR TRANSFERS ONLY, BRACE LOCKED IN [address] [facility] is a 66 y.o. male   PRIMARY DIAGNOSIS: Rupture of left quadriceps tendon  Left knee pain, unspecified chronicity [M25.562]  Rupture of left quadriceps tendon, initial encounter [S76.112A]  Quadriceps tendon rupture, left, initial encounter [S76.112A]  Procedure(s) (LRB):  LEFT KNEE QUADRICEPS TENDON REPAIR -REGIONAL BLOCK (Left)  2 Days Post-Op  Inpatient: Payor: ABSOLUTE TOTAL CARE MEDICAID / Plan: ABSOLUTE TOTAL CARE MEDICAID / Product Type: *No Product type* /     ASSESSMENT:     REHAB RECOMMENDATIONS:   Recommendation to date pending progress:  Setting:  Inpatient Rehab Facility    Equipment:    To Be Determined     ASSESSMENT:  Mr. Payton Reed is making good progress toward goals with improved mobility. He was able  to sit EOB and riana his R prosthetic with supervision.   The patient performed multiple transfers  with the RW and did well

## 2023-11-18 NOTE — PLAN OF CARE
Problem: Chronic Conditions and Co-morbidities  Goal: Patient's chronic conditions and co-morbidity symptoms are monitored and maintained or improved  Outcome: Progressing  Flowsheets (Taken 11/17/2023 2032)  Care Plan - Patient's Chronic Conditions and Co-Morbidity Symptoms are Monitored and Maintained or Improved: Monitor and assess patient's chronic conditions and comorbid symptoms for stability, deterioration, or improvement     Problem: Pain  Goal: Verbalizes/displays adequate comfort level or baseline comfort level  Outcome: Progressing     Problem: Discharge Planning  Goal: Discharge to home or other facility with appropriate resources  Outcome: Progressing  Flowsheets (Taken 11/17/2023 2032)  Discharge to home or other facility with appropriate resources: Identify barriers to discharge with patient and caregiver     Problem: Safety - Adult  Goal: Free from fall injury  Outcome: Progressing  Flowsheets (Taken 11/17/2023 2032)  Free From Fall Injury: Instruct family/caregiver on patient safety     Problem: ABCDS Injury Assessment  Goal: Absence of physical injury  Outcome: Progressing  Flowsheets (Taken 11/17/2023 2032)  Absence of Physical Injury: Implement safety measures based on patient assessment     Problem: Neurosensory - Adult  Goal: Achieves maximal functionality and self care  Outcome: Progressing  Flowsheets (Taken 11/17/2023 2032)  Achieves maximal functionality and self care: Encourage and assist patient to increase activity and self care with guidance from physical therapy/occupational therapy     Problem: Skin/Tissue Integrity - Adult  Goal: Incisions, wounds, or drain sites healing without S/S of infection  Outcome: Progressing  Flowsheets (Taken 11/17/2023 2032)  Incisions, Wounds, or Drain Sites Healing Without Sign and Symptoms of Infection: ADMISSION and DAILY: Assess and document risk factors for pressure ulcer development     Problem: Musculoskeletal - Adult  Goal: Return ADL status to a safe level of function  Outcome: Progressing  Flowsheets (Taken 11/17/2023 2032)  Return ADL Status to a Safe Level of Function: Administer medication as ordered

## 2023-11-18 NOTE — PROGRESS NOTES
ACUTE OCCUPATIONAL THERAPY GOALS:   (Developed with and agreed upon by patient and/or caregiver.)  1. Patient will maintain TDWB status in LLE for 100% of treatment session and minimal verbal cues from therapist.   2. Patient will complete functional transfers with MODIFIED INDEPENDENCE while maintaining TDWB status in LLE and with adaptive equipment as needed. 3. Patient will complete lower body bathing and dressing with SUPERVISION and adaptive equipment as needed. 4. Patient will complete toileting and toilet transfer with SUPERVISION. 5. Patient will tolerate 30 minutes of OT treatment with 1-2 rest breaks to increase activity tolerance for ADLs. 6. Patient will participate in at least 10 minutes of BUE therapeutic exercises to strengthen BUE for functional transfers. Timeframe: 7 visits           OCCUPATIONAL THERAPY: Daily Note AM   OT Visit Days: 2   Time In/Out  OT Charge Capture  Rehab Caseload Tracker  OT Orders    [address] Hicks is a 66 y.o. male   PRIMARY DIAGNOSIS: Rupture of left quadriceps tendon  Left knee pain, unspecified chronicity [M25.562]  Rupture of left quadriceps tendon, initial encounter [S76.112A]  Quadriceps tendon rupture, left, initial encounter [S76.112A]  Procedure(s) (LRB):  LEFT KNEE QUADRICEPS TENDON REPAIR -REGIONAL BLOCK (Left)  2 Days Post-Op  Inpatient: Payor: ABSOL[employer] MEDICAID / Plan: ABSOL[employer] MEDICAID / Product Type: *No Product type* /     ASSESSMENT:     REHAB RECOMMENDATIONS: CURRENT LEVEL OF FUNCTION:  (Most Recently Demonstrated)   Recommendation to date pending progress:  Setting:  Inpatient Rehab Facility     Equipment:    To Be Determined Bathing:  Supervision/Setup  Dressing:  Supervision/Setup  Feeding/Grooming:  Stand by Assist  Toileting:  Supervision/Setup  Functional Mobility:  Stand by Assist     ASSESSMENT:  Mr. Placido Desai is doing well today. Pt presents supine upon arrival. Performing bed mobility with supervision.  Pt [] [] [] [] [] [] [] [] [] [] []    I=Independent, Mod I=Modified Independent, S=Supervision/Setup, SBA=Standby Assistance, CGA=Contact Guard Assistance, Min=Minimal Assistance, Mod=Moderate Assistance, Max=Maximal Assistance, Total=Total Assistance, NT=Not Tested    ACTIVITIES OF DAILY LIVING: I Mod I S SBA CGA Min Mod Max Total NT Comments   BASIC ADLs:              Upper Body   Bathing [] [] [x] [] [] [] [] [] [] []     Lower Body Bathing [] [] [x] [] [] [] [] [] [] []     Toileting [] [] [x] [] [] [] [] [] [] []    Upper Body Dressing [] [] [x] [] [] [] [] [] [] []    Lower Body Dressing [] [] [x] [] [] [] [] [] [] []    Feeding [] [] [] [] [] [] [] [] [] [x]    Grooming [] [] [] [x] [] [] [] [] [] []    Personal Device Care [] [] [] [] [] [] [] [] [] [x]    Functional Mobility [] [] [] [x] [x] [] [] [] [] []    I=Independent, Mod I=Modified Independent, S=Supervision/Setup, SBA=Standby Assistance, CGA=Contact Guard Assistance, Min=Minimal Assistance, Mod=Moderate Assistance, Max=Maximal Assistance, Total=Total Assistance, NT=Not Tested    BALANCE: Good Fair+ Fair Fair- Poor NT Comments   Sitting Static [] [x] [] [] [] []    Sitting Dynamic [] [x] [] [] [] []              Standing Static [] [x] [] [] [] []    Standing Dynamic [] [] [x] [] [] []        PLAN:     FREQUENCY/DURATION   OT Plan of Care: 5 times/week for duration of hospital stay or until stated goals are met, whichever comes first.    TREATMENT:     TREATMENT:   Co-Treatment PT/OT necessary due to patient's decreased overall endurance/tolerance levels, as well as need for high level skilled assistance to complete functional transfers/mobility and functional tasks  Neuromuscular Re-education (20 Minutes): Patient participated in neuromuscular re-education including functional reaching, weight shifting, postural training, standing tolerance activity , and sitting balance activity   with minimal assistance, verbal cues, and tactile cues to improve

## 2023-11-19 LAB
GLUCOSE BLD STRIP.AUTO-MCNC: 114 MG/DL (ref 65–100)
GLUCOSE BLD STRIP.AUTO-MCNC: 131 MG/DL (ref 65–100)
GLUCOSE BLD STRIP.AUTO-MCNC: 76 MG/DL (ref 65–100)
GLUCOSE BLD STRIP.AUTO-MCNC: 90 MG/DL (ref 65–100)
HCT VFR BLD AUTO: 27.2 % (ref 41.1–50.3)
HGB BLD-MCNC: 8.4 G/DL (ref 13.6–17.2)
MM INDURATION, POC: 0 MM (ref 0–5)
PPD, POC: NEGATIVE
SERVICE CMNT-IMP: ABNORMAL
SERVICE CMNT-IMP: ABNORMAL
SERVICE CMNT-IMP: NORMAL
SERVICE CMNT-IMP: NORMAL

## 2023-11-19 PROCEDURE — 85018 HEMOGLOBIN: CPT

## 2023-11-19 PROCEDURE — 2580000003 HC RX 258: Performed by: ORTHOPAEDIC SURGERY

## 2023-11-19 PROCEDURE — 1100000000 HC RM PRIVATE

## 2023-11-19 PROCEDURE — 85014 HEMATOCRIT: CPT

## 2023-11-19 PROCEDURE — 36415 COLL VENOUS BLD VENIPUNCTURE: CPT

## 2023-11-19 PROCEDURE — 6370000000 HC RX 637 (ALT 250 FOR IP): Performed by: PHYSICIAN ASSISTANT

## 2023-11-19 PROCEDURE — 97530 THERAPEUTIC ACTIVITIES: CPT

## 2023-11-19 PROCEDURE — 6360000002 HC RX W HCPCS: Performed by: ORTHOPAEDIC SURGERY

## 2023-11-19 PROCEDURE — 6370000000 HC RX 637 (ALT 250 FOR IP): Performed by: ORTHOPAEDIC SURGERY

## 2023-11-19 PROCEDURE — 82962 GLUCOSE BLOOD TEST: CPT

## 2023-11-19 RX ORDER — ALOGLIPTIN 6.25 MG/1
6.25 TABLET, FILM COATED ORAL DAILY
Status: DISCONTINUED | OUTPATIENT
Start: 2023-11-20 | End: 2023-11-21 | Stop reason: HOSPADM

## 2023-11-19 RX ADMIN — SODIUM BICARBONATE 650 MG TABLET 1300 MG: at 21:04

## 2023-11-19 RX ADMIN — LOSARTAN POTASSIUM 50 MG: 50 TABLET, FILM COATED ORAL at 08:47

## 2023-11-19 RX ADMIN — ALOGLIPTIN 25 MG: 6.25 TABLET, FILM COATED ORAL at 09:40

## 2023-11-19 RX ADMIN — SODIUM CHLORIDE, PRESERVATIVE FREE 5 ML: 5 INJECTION INTRAVENOUS at 21:05

## 2023-11-19 RX ADMIN — SODIUM CHLORIDE, PRESERVATIVE FREE 5 ML: 5 INJECTION INTRAVENOUS at 08:48

## 2023-11-19 RX ADMIN — HEPARIN SODIUM 5000 UNITS: 5000 INJECTION INTRAVENOUS; SUBCUTANEOUS at 05:32

## 2023-11-19 RX ADMIN — HEPARIN SODIUM 5000 UNITS: 5000 INJECTION INTRAVENOUS; SUBCUTANEOUS at 14:27

## 2023-11-19 RX ADMIN — SODIUM BICARBONATE 650 MG TABLET 1300 MG: at 08:47

## 2023-11-19 RX ADMIN — TAMSULOSIN HYDROCHLORIDE 0.4 MG: 0.4 CAPSULE ORAL at 08:47

## 2023-11-19 RX ADMIN — INSULIN GLARGINE 10 UNITS: 100 INJECTION, SOLUTION SUBCUTANEOUS at 21:03

## 2023-11-19 RX ADMIN — OXYCODONE AND ACETAMINOPHEN 1 TABLET: 7.5; 325 TABLET ORAL at 14:26

## 2023-11-19 RX ADMIN — HEPARIN SODIUM 5000 UNITS: 5000 INJECTION INTRAVENOUS; SUBCUTANEOUS at 21:56

## 2023-11-19 RX ADMIN — SODIUM CHLORIDE, PRESERVATIVE FREE 5 ML: 5 INJECTION INTRAVENOUS at 08:49

## 2023-11-19 ASSESSMENT — PAIN SCALES - GENERAL
PAINLEVEL_OUTOF10: 6
PAINLEVEL_OUTOF10: 0

## 2023-11-19 ASSESSMENT — PAIN DESCRIPTION - DESCRIPTORS: DESCRIPTORS: ACHING

## 2023-11-19 ASSESSMENT — PAIN DESCRIPTION - ORIENTATION: ORIENTATION: LEFT

## 2023-11-19 ASSESSMENT — PAIN DESCRIPTION - LOCATION: LOCATION: LEG

## 2023-11-19 ASSESSMENT — PAIN SCALES - WONG BAKER: WONGBAKER_NUMERICALRESPONSE: 0

## 2023-11-19 NOTE — PROGRESS NOTES
ACUTE PHYSICAL THERAPY GOALS:   (Developed with and agreed upon by patient and/or caregiver.)    (1.) [facility]  will move from supine to sit and sit to supine  with INDEPENDENCE within 7 treatment day(s). (2.) [facility] will transfer from sit to stand and stand to sit with SUPERVISION using the least restrictive device within 7 treatment day(s). (2.) [facility] will transfer from bed to chair and chair to bed with SUPERVISION using the least restrictive device within 7 treatment day(s). (3.) [facility] will perform standing static and dynamic balance activities x 15 minutes with STAND BY ASSIST to improve safety within 7 treatment day(s). (4.) [facility] will perform therapeutic exercises x 20 min for HEP with INDEPENDENCE to improve strength, endurance, and functional mobility within 7 treatment day(s). PHYSICAL THERAPY: Daily Note PM   (Link to Caseload Tracking: PT Visit Days : 3  Time In/Out PT Charge Capture  Rehab Caseload Tracker  Orders    L LE TDWB FOR TRANSFERS ONLY, BRACE LOCKED IN 2000 [facility] is a 66 y.o. male   PRIMARY DIAGNOSIS: Rupture of left quadriceps tendon  Left knee pain, unspecified chronicity [M25.562]  Rupture of left quadriceps tendon, initial encounter [S76.112A]  Quadriceps tendon rupture, left, initial encounter [S76.112A]  Procedure(s) (LRB):  LEFT KNEE QUADRICEPS TENDON REPAIR -REGIONAL BLOCK (Left)  3 Days Post-Op  Inpatient: Payor: ABSOLUTE TOTAL [employer] MEDICAID / Plan: ABSOL[employer] MEDICAID / Product Type: *No Product type* /     ASSESSMENT:     REHAB RECOMMENDATIONS:   Recommendation to date pending progress:  Setting:  Inpatient Rehab Facility    Equipment:    To Be Determined     ASSESSMENT:  Mr. Jennifer Rivera demonstrated good progress toward goals this session. He is very indpendent and able to perform most self care on his own. He performed bed mobility, transfers, and gait with CGA and the RW.   He

## 2023-11-19 NOTE — PLAN OF CARE
Problem: Chronic Conditions and Co-morbidities  Goal: Patient's chronic conditions and co-morbidity symptoms are monitored and maintained or improved  11/19/2023 0931 by Jose Raul Tejeda RN  Outcome: Progressing  11/18/2023 2338 by Christy Tellez RN  Outcome: Progressing  Flowsheets (Taken 11/18/2023 1931)  Care Plan - Patient's Chronic Conditions and Co-Morbidity Symptoms are Monitored and Maintained or Improved: Monitor and assess patient's chronic conditions and comorbid symptoms for stability, deterioration, or improvement     Problem: Pain  Goal: Verbalizes/displays adequate comfort level or baseline comfort level  11/19/2023 0931 by Jose Raul Tejeda RN  Outcome: Progressing  11/18/2023 2338 by Christy Tellez RN  Outcome: Progressing     Problem: Discharge Planning  Goal: Discharge to home or other facility with appropriate resources  11/19/2023 0931 by Jose Raul Tejeda RN  Outcome: Progressing  11/18/2023 2338 by Christy Tellez RN  Outcome: Progressing  Flowsheets (Taken 11/18/2023 1931)  Discharge to home or other facility with appropriate resources: Identify barriers to discharge with patient and caregiver     Problem: Safety - Adult  Goal: Free from fall injury  11/19/2023 0931 by Jose Raul Tejeda RN  Outcome: Progressing  11/18/2023 2338 by Christy Tellez RN  Outcome: Progressing  Flowsheets (Taken 11/18/2023 1931)  Free From Fall Injury: Instruct family/caregiver on patient safety     Problem: ABCDS Injury Assessment  Goal: Absence of physical injury  11/19/2023 0931 by Jose Raul Tejeda RN  Outcome: Progressing  11/18/2023 2338 by Christy Tellez RN  Outcome: Progressing  Flowsheets (Taken 11/18/2023 1931)  Absence of Physical Injury: Implement safety measures based on patient assessment     Problem: Neurosensory - Adult  Goal: Achieves maximal functionality and self care  11/19/2023 0931 by Jose Raul Tejeda RN  Outcome: Progressing  11/18/2023 2338 by Christy Tellez RN  Outcome: Progressing  Flowsheets (Taken

## 2023-11-19 NOTE — PROGRESS NOTES
ACUTE PHYSICAL THERAPY GOALS:   (Developed with and agreed upon by patient and/or caregiver.)    (1.) [facility]  will move from supine to sit and sit to supine  with INDEPENDENCE within 7 treatment day(s). (2.) [facility] will transfer from sit to stand and stand to sit with SUPERVISION using the least restrictive device within 7 treatment day(s). (2.) [facility] will transfer from bed to chair and chair to bed with SUPERVISION using the least restrictive device within 7 treatment day(s). (3.) [facility] will perform standing static and dynamic balance activities x 15 minutes with STAND BY ASSIST to improve safety within 7 treatment day(s). (4.) [facility] will perform therapeutic exercises x 20 min for HEP with INDEPENDENCE to improve strength, endurance, and functional mobility within 7 treatment day(s). PHYSICAL THERAPY: Daily Note AM   (Link to Caseload Tracking: PT Visit Days : 3  Time In/Out PT Charge Capture  Rehab Caseload Tracker  Orders    L LE TDWB FOR TRANSFERS ONLY, BRACE LOCKED IN 2000 [facility] is a 66 y.o. male   PRIMARY DIAGNOSIS: Rupture of left quadriceps tendon  Left knee pain, unspecified chronicity [M25.562]  Rupture of left quadriceps tendon, initial encounter [S76.112A]  Quadriceps tendon rupture, left, initial encounter [S76.112A]  Procedure(s) (LRB):  LEFT KNEE QUADRICEPS TENDON REPAIR -REGIONAL BLOCK (Left)  3 Days Post-Op  Inpatient: Payor: ABSOLUTE TOTAL CARE MEDICAID / Plan: ABSOLUTE TOTAL C[employer] MEDICAID / Product Type: *No Product type* /     ASSESSMENT:     REHAB RECOMMENDATIONS:   Recommendation to date pending progress:  Setting:  Inpatient Rehab Facility    Equipment:    To Be Determined     ASSESSMENT:  Mr. Toni Murphy demonstrated good progress toward goals this session. He is very indpendent and able to perform most self care on his own. He performed bed mobility, transfers, and gait with CGA and the RW.   He Max=Maximal Assistance, Total=Total Assistance, NT=Not Tested    BALANCE: Good Fair+ Fair Fair- Poor NT Comments   Sitting Static [x] [] [] [] [] []    Sitting Dynamic [] [x] [] [] [] []              Standing Static [] [x] [x] [] [] []    Standing Dynamic [] [] [x] [] [] []      GAIT: I Mod I S SBA CGA Min Mod Max Total  NT x2 Comments:   Level of Assistance [] [] [] [] [x] [] [] [] [] [] []    Distance   60'    DME Rolling Walker    Gait Quality hopping    Weightbearing Status      Stairs      I=Independent, Mod I=Modified Independent, S=Supervision, SBA=Standby Assistance, CGA=Contact Guard Assistance,   Min=Minimal Assistance, Mod=Moderate Assistance, Max=Maximal Assistance, Total=Total Assistance, NT=Not Tested    PLAN:   FREQUENCY AND DURATION: BID for duration of hospital stay or until stated goals are met, whichever comes first.    TREATMENT:   TREATMENT:   Therapeutic Activity (40 Minutes): Therapeutic activity included Supine to Sit, Scooting, Transfer Training, Ambulation on level ground, Sitting balance , and Standing balance to improve functional Activity tolerance, Balance, Mobility, and Strength.     TREATMENT GRID:  N/A    AFTER TREATMENT PRECAUTIONS: Bed/Chair Locked, Call light within reach, Chair, Needs within reach, and RN notified    INTERDISCIPLINARY COLLABORATION:  RN/ PCT and PT/ PTA    EDUCATION:      TIME IN/OUT:  Time In: 2908  Time Out: 0945  Minutes: Arina Jones

## 2023-11-19 NOTE — PLAN OF CARE
Problem: Chronic Conditions and Co-morbidities  Goal: Patient's chronic conditions and co-morbidity symptoms are monitored and maintained or improved  11/18/2023 2338 by Carl Wiggins RN  Outcome: Progressing  Flowsheets (Taken 11/18/2023 1931)  Care Plan - Patient's Chronic Conditions and Co-Morbidity Symptoms are Monitored and Maintained or Improved: Monitor and assess patient's chronic conditions and comorbid symptoms for stability, deterioration, or improvement  11/18/2023 1324 by Marek Johnson RN  Outcome: Progressing     Problem: Pain  Goal: Verbalizes/displays adequate comfort level or baseline comfort level  11/18/2023 2338 by Carl Wiggins RN  Outcome: Progressing  11/18/2023 1324 by Marek Johnson RN  Outcome: Progressing     Problem: Discharge Planning  Goal: Discharge to home or other facility with appropriate resources  11/18/2023 2338 by Carl Wiggins RN  Outcome: Progressing  Flowsheets (Taken 11/18/2023 1931)  Discharge to home or other facility with appropriate resources: Identify barriers to discharge with patient and caregiver  11/18/2023 1324 by Marek Johnson RN  Outcome: Progressing  Flowsheets (Taken 11/18/2023 0825)  Discharge to home or other facility with appropriate resources: Identify barriers to discharge with patient and caregiver     Problem: Safety - Adult  Goal: Free from fall injury  11/18/2023 2338 by Carl Wiggins RN  Outcome: Progressing  Flowsheets (Taken 11/18/2023 1931)  Free From Fall Injury: Instruct family/caregiver on patient safety  11/18/2023 1324 by Marek Johnson RN  Outcome: Progressing     Problem: ABCDS Injury Assessment  Goal: Absence of physical injury  11/18/2023 2338 by Carl Wiggins RN  Outcome: Progressing  Flowsheets (Taken 11/18/2023 1931)  Absence of Physical Injury: Implement safety measures based on patient assessment  11/18/2023 1324 by Marek Johnson RN  Outcome: Progressing     Problem: Neurosensory - Adult  Goal: Achieves maximal functionality and self care  11/18/2023 2338 by Alice Evans RN  Outcome: Progressing  Flowsheets (Taken 11/18/2023 1931)  Achieves maximal functionality and self care: Monitor swallowing and airway patency with patient fatigue and changes in neurological status  11/18/2023 1324 by Crow Ayon RN  Outcome: Progressing     Problem: Skin/Tissue Integrity - Adult  Goal: Incisions, wounds, or drain sites healing without S/S of infection  11/18/2023 2338 by Alice Evans RN  Outcome: Progressing  Flowsheets (Taken 11/18/2023 1931)  Incisions, Wounds, or Drain Sites Healing Without Sign and Symptoms of Infection: ADMISSION and DAILY: Assess and document risk factors for pressure ulcer development  11/18/2023 1324 by Crow Ayon RN  Outcome: Progressing  Goal: Skin integrity remains intact  11/18/2023 2338 by Alice Evans RN  Outcome: Progressing  Flowsheets (Taken 11/18/2023 1931)  Skin Integrity Remains Intact: Monitor for areas of redness and/or skin breakdown  11/18/2023 1324 by Crow Ayon RN  Outcome: Progressing     Problem: Musculoskeletal - Adult  Goal: Return ADL status to a safe level of function  11/18/2023 2338 by Alice Evans RN  Outcome: Progressing  Flowsheets (Taken 11/18/2023 1931)  Return ADL Status to a Safe Level of Function: Administer medication as ordered  11/18/2023 1324 by Crow Ayon RN  Outcome: Progressing  Goal: Return mobility to safest level of function  11/18/2023 2338 by Alice Evans RN  Outcome: Progressing  Flowsheets (Taken 11/18/2023 1931)  Return Mobility to Safest Level of Function: Assess patient stability and activity tolerance for standing, transferring and ambulating with or without assistive devices  11/18/2023 1324 by Crow Ayon RN  Outcome: Progressing  Goal: Maintain proper alignment of affected body part  11/18/2023 2338 by Alice Evans RN  Outcome: Progressing  Flowsheets (Taken 11/18/2023 1931)  Maintain proper alignment of affected body part: Support and protect limb and body alignment

## 2023-11-20 LAB
GLUCOSE BLD STRIP.AUTO-MCNC: 140 MG/DL (ref 65–100)
GLUCOSE BLD STRIP.AUTO-MCNC: 150 MG/DL (ref 65–100)
GLUCOSE BLD STRIP.AUTO-MCNC: 161 MG/DL (ref 65–100)
GLUCOSE BLD STRIP.AUTO-MCNC: 81 MG/DL (ref 65–100)
HCT VFR BLD AUTO: 23.1 % (ref 41.1–50.3)
HGB BLD-MCNC: 7 G/DL (ref 13.6–17.2)
SERVICE CMNT-IMP: ABNORMAL
SERVICE CMNT-IMP: NORMAL

## 2023-11-20 PROCEDURE — 2580000003 HC RX 258: Performed by: ORTHOPAEDIC SURGERY

## 2023-11-20 PROCEDURE — 6370000000 HC RX 637 (ALT 250 FOR IP): Performed by: ORTHOPAEDIC SURGERY

## 2023-11-20 PROCEDURE — 6370000000 HC RX 637 (ALT 250 FOR IP): Performed by: PHYSICIAN ASSISTANT

## 2023-11-20 PROCEDURE — 1100000000 HC RM PRIVATE

## 2023-11-20 PROCEDURE — 97116 GAIT TRAINING THERAPY: CPT

## 2023-11-20 PROCEDURE — 6360000002 HC RX W HCPCS: Performed by: ORTHOPAEDIC SURGERY

## 2023-11-20 PROCEDURE — 97112 NEUROMUSCULAR REEDUCATION: CPT

## 2023-11-20 PROCEDURE — 85014 HEMATOCRIT: CPT

## 2023-11-20 PROCEDURE — 97530 THERAPEUTIC ACTIVITIES: CPT

## 2023-11-20 PROCEDURE — 97535 SELF CARE MNGMENT TRAINING: CPT

## 2023-11-20 PROCEDURE — 85018 HEMOGLOBIN: CPT

## 2023-11-20 PROCEDURE — 36415 COLL VENOUS BLD VENIPUNCTURE: CPT

## 2023-11-20 PROCEDURE — 82962 GLUCOSE BLOOD TEST: CPT

## 2023-11-20 PROCEDURE — 6370000000 HC RX 637 (ALT 250 FOR IP): Performed by: PHYSICAL MEDICINE & REHABILITATION

## 2023-11-20 RX ADMIN — SODIUM BICARBONATE 650 MG TABLET 1300 MG: at 20:36

## 2023-11-20 RX ADMIN — SODIUM CHLORIDE, PRESERVATIVE FREE 10 ML: 5 INJECTION INTRAVENOUS at 20:31

## 2023-11-20 RX ADMIN — LOSARTAN POTASSIUM 50 MG: 50 TABLET, FILM COATED ORAL at 08:04

## 2023-11-20 RX ADMIN — HEPARIN SODIUM 5000 UNITS: 5000 INJECTION INTRAVENOUS; SUBCUTANEOUS at 20:36

## 2023-11-20 RX ADMIN — HEPARIN SODIUM 5000 UNITS: 5000 INJECTION INTRAVENOUS; SUBCUTANEOUS at 14:00

## 2023-11-20 RX ADMIN — INSULIN GLARGINE 10 UNITS: 100 INJECTION, SOLUTION SUBCUTANEOUS at 20:36

## 2023-11-20 RX ADMIN — SODIUM CHLORIDE, PRESERVATIVE FREE 10 ML: 5 INJECTION INTRAVENOUS at 08:04

## 2023-11-20 RX ADMIN — HEPARIN SODIUM 5000 UNITS: 5000 INJECTION INTRAVENOUS; SUBCUTANEOUS at 05:37

## 2023-11-20 RX ADMIN — TAMSULOSIN HYDROCHLORIDE 0.4 MG: 0.4 CAPSULE ORAL at 08:04

## 2023-11-20 RX ADMIN — ALOGLIPTIN 6.25 MG: 6.25 TABLET, FILM COATED ORAL at 08:04

## 2023-11-20 RX ADMIN — SODIUM BICARBONATE 650 MG TABLET 1300 MG: at 08:04

## 2023-11-20 ASSESSMENT — PAIN SCALES - WONG BAKER: WONGBAKER_NUMERICALRESPONSE: 0

## 2023-11-20 ASSESSMENT — PAIN SCALES - GENERAL
PAINLEVEL_OUTOF10: 0
PAINLEVEL_OUTOF10: 0

## 2023-11-20 NOTE — PROGRESS NOTES
ACUTE PHYSICAL THERAPY GOALS:   (Developed with and agreed upon by patient and/or caregiver.)    (1.) [facility]  will move from supine to sit and sit to supine  with INDEPENDENCE within 7 treatment day(s). (2.) [facility] will transfer from sit to stand and stand to sit with SUPERVISION using the least restrictive device within 7 treatment day(s). (2.) [facility] will transfer from bed to chair and chair to bed with SUPERVISION using the least restrictive device within 7 treatment day(s). (3.) [facility] will perform standing static and dynamic balance activities x 15 minutes with STAND BY ASSIST to improve safety within 7 treatment day(s). (4.) [facility] will perform therapeutic exercises x 20 min for HEP with INDEPENDENCE to improve strength, endurance, and functional mobility within 7 treatment day(s). PHYSICAL THERAPY: Daily Note PM   (Link to Caseload Tracking: PT Visit Days : 4  Time In/Out PT Charge Capture  Rehab Caseload Tracker  Orders    L LE TDWB FOR TRANSFERS ONLY, BRACE LOCKED IN 2000 [facility] is a 66 y.o. male   PRIMARY DIAGNOSIS: Rupture of left quadriceps tendon  Left knee pain, unspecified chronicity [M25.562]  Rupture of left quadriceps tendon, initial encounter [S76.112A]  Quadriceps tendon rupture, left, initial encounter [S76.112A]  Procedure(s) (LRB):  LEFT KNEE QUADRICEPS TENDON REPAIR -REGIONAL BLOCK (Left)  4 Days Post-Op  Inpatient: Payor: ABSOLUTE TOTAL CARE MEDICAID / Plan: ABSOLUTE TOTAL CARE MEDICAID / Product Type: *No Product type* /     ASSESSMENT:     REHAB RECOMMENDATIONS:   Recommendation to date pending progress:  Setting:  Inpatient Rehab Facility    Equipment:    To Be Determined     ASSESSMENT:  Mr. Wally Ambrosio continues to progress well toward goals with increased mobility and activity tolerance. The patient performed bed mobility and transfers with CGA/SBA.   He is able to perform multiple transfers and

## 2023-11-20 NOTE — PROGRESS NOTES
ACUTE OCCUPATIONAL THERAPY GOALS:   (Developed with and agreed upon by patient and/or caregiver.)  1. Patient will maintain TDWB status in LLE for 100% of treatment session and minimal verbal cues from therapist.   2. Patient will complete functional transfers with MODIFIED INDEPENDENCE while maintaining TDWB status in LLE and with adaptive equipment as needed. 3. Patient will complete lower body bathing and dressing with SUPERVISION and adaptive equipment as needed. 4. Patient will complete toileting and toilet transfer with SUPERVISION. 5. Patient will tolerate 30 minutes of OT treatment with 1-2 rest breaks to increase activity tolerance for ADLs. 6. Patient will participate in at least 10 minutes of BUE therapeutic exercises to strengthen BUE for functional transfers. Timeframe: 7 visits           OCCUPATIONAL THERAPY: Daily Note AM   OT Visit Days: 3   Time In/Out  OT Charge Capture  Rehab Caseload Tracker  OT [address] Fed[address] Orr is a 66 y.o. male   PRIMARY DIAGNOSIS: Rupture of left quadriceps tendon  Left knee pain, unspecified chronicity [M25.562]  Rupture of left quadriceps tendon, initial encounter [S76.112A]  Quadriceps tendon rupture, left, initial encounter [S76.112A]  Procedure(s) (LRB):  LEFT KNEE QUADRICEPS TENDON REPAIR -REGIONAL BLOCK (Left)  4 Days Post-Op  Inpatient: Payor: ABSOLUTE TOTAL CARE MEDICAID / Plan: ABSOLUTE TOTAL CARE MEDICAID / Product Type: *No Product type* /     ASSESSMENT:     REHAB RECOMMENDATIONS: CURRENT LEVEL OF FUNCTION:  (Most Recently Demonstrated)   Recommendation to date pending progress:  Setting:  Inpatient Rehab Facility     Equipment:    To Be Determined Bathing:  Supervision/Setup  Dressing:  Supervision/Setup  Feeding/Grooming:  Supervision/Setup  Toileting:  Supervision/Setup  Functional Mobility:  Stand by Assist     ASSESSMENT:  Mr. Eric Painting is doing well today. Pt presents supine upon arrival. Performing bed mobility with supervision.  Pt

## 2023-11-20 NOTE — PLAN OF CARE
Problem: Chronic Conditions and Co-morbidities  Goal: Patient's chronic conditions and co-morbidity symptoms are monitored and maintained or improved  Outcome: Progressing  Flowsheets (Taken 11/19/2023 1949)  Care Plan - Patient's Chronic Conditions and Co-Morbidity Symptoms are Monitored and Maintained or Improved: Monitor and assess patient's chronic conditions and comorbid symptoms for stability, deterioration, or improvement     Problem: Pain  Goal: Verbalizes/displays adequate comfort level or baseline comfort level  Outcome: Progressing  Flowsheets (Taken 11/19/2023 1949)  Verbalizes/displays adequate comfort level or baseline comfort level:   Encourage patient to monitor pain and request assistance   Assess pain using appropriate pain scale     Problem: Discharge Planning  Goal: Discharge to home or other facility with appropriate resources  Outcome: Progressing  Flowsheets (Taken 11/19/2023 1949)  Discharge to home or other facility with appropriate resources: Identify barriers to discharge with patient and caregiver     Problem: Safety - Adult  Goal: Free from fall injury  Outcome: Progressing  Flowsheets (Taken 11/19/2023 1949)  Free From Fall Injury: Instruct family/caregiver on patient safety     Problem: ABCDS Injury Assessment  Goal: Absence of physical injury  Outcome: Progressing  Flowsheets (Taken 11/19/2023 1949)  Absence of Physical Injury: Implement safety measures based on patient assessment     Problem: Neurosensory - Adult  Goal: Achieves maximal functionality and self care  Outcome: Progressing  Flowsheets (Taken 11/19/2023 1949)  Achieves maximal functionality and self care: Monitor swallowing and airway patency with patient fatigue and changes in neurological status     Problem: Skin/Tissue Integrity - Adult  Goal: Incisions, wounds, or drain sites healing without S/S of infection  Outcome: Progressing  Flowsheets (Taken 11/19/2023 1949)  Incisions, Wounds, or Drain Sites Healing Without

## 2023-11-20 NOTE — PLAN OF CARE
Problem: Chronic Conditions and Co-morbidities  Goal: Patient's chronic conditions and co-morbidity symptoms are monitored and maintained or improved  11/20/2023 1424 by Wagner Lopez RN  Outcome: Progressing  Flowsheets (Taken 11/20/2023 0752)  Care Plan - Patient's Chronic Conditions and Co-Morbidity Symptoms are Monitored and Maintained or Improved:   Monitor and assess patient's chronic conditions and comorbid symptoms for stability, deterioration, or improvement   Collaborate with multidisciplinary team to address chronic and comorbid conditions and prevent exacerbation or deterioration   Update acute care plan with appropriate goals if chronic or comorbid symptoms are exacerbated and prevent overall improvement and discharge  11/20/2023 0048 by Steven Monte RN  Outcome: Progressing  Flowsheets (Taken 11/19/2023 1949)  Care Plan - Patient's Chronic Conditions and Co-Morbidity Symptoms are Monitored and Maintained or Improved: Monitor and assess patient's chronic conditions and comorbid symptoms for stability, deterioration, or improvement     Problem: Pain  Goal: Verbalizes/displays adequate comfort level or baseline comfort level  11/20/2023 1424 by Wagner Lopez RN  Outcome: Progressing  11/20/2023 0048 by Steven Monte RN  Outcome: Progressing  Flowsheets (Taken 11/19/2023 1949)  Verbalizes/displays adequate comfort level or baseline comfort level:   Encourage patient to monitor pain and request assistance   Assess pain using appropriate pain scale     Problem: Discharge Planning  Goal: Discharge to home or other facility with appropriate resources  11/20/2023 1424 by Wagner Lopez RN  Outcome: Progressing  Flowsheets (Taken 11/20/2023 0752)  Discharge to home or other facility with appropriate resources:   Identify barriers to discharge with patient and caregiver   Arrange for needed discharge resources and transportation as appropriate   Identify discharge learning needs (meds, wound care,

## 2023-11-20 NOTE — PROGRESS NOTES
ACUTE PHYSICAL THERAPY GOALS:   (Developed with and agreed upon by patient and/or caregiver.)    (1.) [facility]  will move from supine to sit and sit to supine  with INDEPENDENCE within 7 treatment day(s). (2.) [facility] will transfer from sit to stand and stand to sit with SUPERVISION using the least restrictive device within 7 treatment day(s). (2.) [facility] will transfer from bed to chair and chair to bed with SUPERVISION using the least restrictive device within 7 treatment day(s). (3.) [facility] will perform standing static and dynamic balance activities x 15 minutes with STAND BY ASSIST to improve safety within 7 treatment day(s). (4.) [facility] will perform therapeutic exercises x 20 min for HEP with INDEPENDENCE to improve strength, endurance, and functional mobility within 7 treatment day(s). PHYSICAL THERAPY: Daily Note AM   (Link to Caseload Tracking: PT Visit Days : 4  Time In/Out PT Charge Capture  Rehab Caseload Tracker  Orders    L LE TDWB FOR TRANSFERS ONLY, BRACE LOCKED IN [address] [facility] is a 66 y.o. male   PRIMARY DIAGNOSIS: Rupture of left quadriceps tendon  Left knee pain, unspecified chronicity [M25.562]  Rupture of left quadriceps tendon, initial encounter [S76.112A]  Quadriceps tendon rupture, left, initial encounter [S76.112A]  Procedure(s) (LRB):  LEFT KNEE QUADRICEPS TENDON REPAIR -REGIONAL BLOCK (Left)  4 Days Post-Op  Inpatient: Payor: ABSOLUTE TOTAL CARE MEDICAID / Plan: ABSOLUTE TOTAL C[employer] MEDICAID / Product Type: *No Product type* /     ASSESSMENT:     REHAB RECOMMENDATIONS:   Recommendation to date pending progress:  Setting:  Inpatient Rehab Facility    Equipment:    To Be Determined     ASSESSMENT:  Mr. Guerita Lord continues to progress well toward goals with increased mobility and activity tolerance. The patient performed bed mobility and transfers with CGA/SBA.   He is able to perform multiple transfers and

## 2023-11-21 ENCOUNTER — HOSPITAL ENCOUNTER (INPATIENT)
Age: 63
DRG: 862 | End: 2023-11-21
Attending: INTERNAL MEDICINE | Admitting: PHYSICAL MEDICINE & REHABILITATION
Payer: MEDICAID

## 2023-11-21 VITALS
TEMPERATURE: 98.4 F | WEIGHT: 168 LBS | BODY MASS INDEX: 20.46 KG/M2 | HEART RATE: 71 BPM | SYSTOLIC BLOOD PRESSURE: 130 MMHG | OXYGEN SATURATION: 99 % | DIASTOLIC BLOOD PRESSURE: 78 MMHG | RESPIRATION RATE: 18 BRPM | HEIGHT: 76 IN

## 2023-11-21 DIAGNOSIS — E11.65 TYPE 2 DIABETES MELLITUS WITH HYPERGLYCEMIA, WITH LONG-TERM CURRENT USE OF INSULIN (HCC): ICD-10-CM

## 2023-11-21 DIAGNOSIS — Z79.4 TYPE 2 DIABETES MELLITUS WITH HYPERGLYCEMIA, WITH LONG-TERM CURRENT USE OF INSULIN (HCC): ICD-10-CM

## 2023-11-21 LAB
GLUCOSE BLD STRIP.AUTO-MCNC: 110 MG/DL (ref 65–100)
GLUCOSE BLD STRIP.AUTO-MCNC: 116 MG/DL (ref 65–100)
GLUCOSE BLD STRIP.AUTO-MCNC: 157 MG/DL (ref 65–100)
GLUCOSE BLD STRIP.AUTO-MCNC: 74 MG/DL (ref 65–100)
HCT VFR BLD AUTO: 22.9 % (ref 41.1–50.3)
HGB BLD-MCNC: 7 G/DL (ref 13.6–17.2)
PLATELET # BLD AUTO: 259 K/UL (ref 150–450)
SERVICE CMNT-IMP: ABNORMAL
SERVICE CMNT-IMP: NORMAL

## 2023-11-21 PROCEDURE — 97535 SELF CARE MNGMENT TRAINING: CPT

## 2023-11-21 PROCEDURE — 99223 1ST HOSP IP/OBS HIGH 75: CPT | Performed by: PHYSICAL MEDICINE & REHABILITATION

## 2023-11-21 PROCEDURE — 85014 HEMATOCRIT: CPT

## 2023-11-21 PROCEDURE — 6370000000 HC RX 637 (ALT 250 FOR IP): Performed by: ORTHOPAEDIC SURGERY

## 2023-11-21 PROCEDURE — 2580000003 HC RX 258: Performed by: ORTHOPAEDIC SURGERY

## 2023-11-21 PROCEDURE — 97116 GAIT TRAINING THERAPY: CPT

## 2023-11-21 PROCEDURE — 97530 THERAPEUTIC ACTIVITIES: CPT

## 2023-11-21 PROCEDURE — 85049 AUTOMATED PLATELET COUNT: CPT

## 2023-11-21 PROCEDURE — 1180000000 HC REHAB R&B

## 2023-11-21 PROCEDURE — 36415 COLL VENOUS BLD VENIPUNCTURE: CPT

## 2023-11-21 PROCEDURE — 6370000000 HC RX 637 (ALT 250 FOR IP): Performed by: PHYSICAL MEDICINE & REHABILITATION

## 2023-11-21 PROCEDURE — 6360000002 HC RX W HCPCS: Performed by: PHYSICAL MEDICINE & REHABILITATION

## 2023-11-21 PROCEDURE — 6360000002 HC RX W HCPCS: Performed by: ORTHOPAEDIC SURGERY

## 2023-11-21 PROCEDURE — 2580000003 HC RX 258: Performed by: PHYSICAL MEDICINE & REHABILITATION

## 2023-11-21 PROCEDURE — 85018 HEMOGLOBIN: CPT

## 2023-11-21 PROCEDURE — 82962 GLUCOSE BLOOD TEST: CPT

## 2023-11-21 PROCEDURE — 97161 PT EVAL LOW COMPLEX 20 MIN: CPT

## 2023-11-21 RX ORDER — DIPHENHYDRAMINE HYDROCHLORIDE 50 MG/ML
25 INJECTION INTRAMUSCULAR; INTRAVENOUS EVERY 6 HOURS PRN
Status: CANCELLED | OUTPATIENT
Start: 2023-11-21

## 2023-11-21 RX ORDER — LOPERAMIDE HYDROCHLORIDE 2 MG/1
2 CAPSULE ORAL 4 TIMES DAILY PRN
Status: CANCELLED | OUTPATIENT
Start: 2023-11-21

## 2023-11-21 RX ORDER — DEXTROSE MONOHYDRATE 100 MG/ML
INJECTION, SOLUTION INTRAVENOUS CONTINUOUS PRN
Status: CANCELLED | OUTPATIENT
Start: 2023-11-21

## 2023-11-21 RX ORDER — DIPHENHYDRAMINE HCL 25 MG
25 CAPSULE ORAL EVERY 6 HOURS PRN
Status: DISCONTINUED | OUTPATIENT
Start: 2023-11-21 | End: 2023-11-28 | Stop reason: HOSPADM

## 2023-11-21 RX ORDER — SODIUM BICARBONATE 650 MG/1
1300 TABLET ORAL 2 TIMES DAILY
Status: DISCONTINUED | OUTPATIENT
Start: 2023-11-21 | End: 2023-11-28 | Stop reason: HOSPADM

## 2023-11-21 RX ORDER — SODIUM CHLORIDE 0.9 % (FLUSH) 0.9 %
5-40 SYRINGE (ML) INJECTION EVERY 12 HOURS SCHEDULED
Status: CANCELLED | OUTPATIENT
Start: 2023-11-21

## 2023-11-21 RX ORDER — ONDANSETRON 4 MG/1
4 TABLET, ORALLY DISINTEGRATING ORAL EVERY 8 HOURS PRN
Status: CANCELLED | OUTPATIENT
Start: 2023-11-21

## 2023-11-21 RX ORDER — ENEMA 19; 7 G/133ML; G/133ML
1 ENEMA RECTAL DAILY PRN
Status: DISCONTINUED | OUTPATIENT
Start: 2023-11-21 | End: 2023-11-28 | Stop reason: HOSPADM

## 2023-11-21 RX ORDER — DEXTROSE MONOHYDRATE 100 MG/ML
INJECTION, SOLUTION INTRAVENOUS CONTINUOUS PRN
Status: DISCONTINUED | OUTPATIENT
Start: 2023-11-21 | End: 2023-11-28 | Stop reason: HOSPADM

## 2023-11-21 RX ORDER — SODIUM BICARBONATE 650 MG/1
1300 TABLET ORAL 2 TIMES DAILY
Status: CANCELLED | OUTPATIENT
Start: 2023-11-21

## 2023-11-21 RX ORDER — ALOGLIPTIN 6.25 MG/1
6.25 TABLET, FILM COATED ORAL DAILY
Status: CANCELLED | OUTPATIENT
Start: 2023-11-22

## 2023-11-21 RX ORDER — SODIUM CHLORIDE 9 MG/ML
INJECTION, SOLUTION INTRAVENOUS PRN
Status: DISCONTINUED | OUTPATIENT
Start: 2023-11-21 | End: 2023-11-28 | Stop reason: HOSPADM

## 2023-11-21 RX ORDER — LOPERAMIDE HYDROCHLORIDE 2 MG/1
2 CAPSULE ORAL 4 TIMES DAILY PRN
Status: DISCONTINUED | OUTPATIENT
Start: 2023-11-21 | End: 2023-11-28 | Stop reason: HOSPADM

## 2023-11-21 RX ORDER — DIPHENHYDRAMINE HCL 25 MG
25 CAPSULE ORAL EVERY 6 HOURS PRN
Status: CANCELLED | OUTPATIENT
Start: 2023-11-21

## 2023-11-21 RX ORDER — DIPHENHYDRAMINE HYDROCHLORIDE 50 MG/ML
25 INJECTION INTRAMUSCULAR; INTRAVENOUS EVERY 6 HOURS PRN
Status: DISCONTINUED | OUTPATIENT
Start: 2023-11-21 | End: 2023-11-28 | Stop reason: HOSPADM

## 2023-11-21 RX ORDER — INSULIN GLARGINE 100 [IU]/ML
10 INJECTION, SOLUTION SUBCUTANEOUS NIGHTLY
Status: DISCONTINUED | OUTPATIENT
Start: 2023-11-21 | End: 2023-11-28 | Stop reason: HOSPADM

## 2023-11-21 RX ORDER — ONDANSETRON 2 MG/ML
4 INJECTION INTRAMUSCULAR; INTRAVENOUS EVERY 6 HOURS PRN
Status: DISCONTINUED | OUTPATIENT
Start: 2023-11-21 | End: 2023-11-28 | Stop reason: HOSPADM

## 2023-11-21 RX ORDER — OXYCODONE AND ACETAMINOPHEN 7.5; 325 MG/1; MG/1
1 TABLET ORAL EVERY 4 HOURS PRN
Status: CANCELLED | OUTPATIENT
Start: 2023-11-21

## 2023-11-21 RX ORDER — SODIUM CHLORIDE 0.9 % (FLUSH) 0.9 %
5-40 SYRINGE (ML) INJECTION PRN
Status: CANCELLED | OUTPATIENT
Start: 2023-11-21

## 2023-11-21 RX ORDER — ONDANSETRON 4 MG/1
4 TABLET, ORALLY DISINTEGRATING ORAL EVERY 8 HOURS PRN
Status: DISCONTINUED | OUTPATIENT
Start: 2023-11-21 | End: 2023-11-28 | Stop reason: HOSPADM

## 2023-11-21 RX ORDER — BISACODYL 10 MG
10 SUPPOSITORY, RECTAL RECTAL DAILY PRN
Status: CANCELLED | OUTPATIENT
Start: 2023-11-21

## 2023-11-21 RX ORDER — ENEMA 19; 7 G/133ML; G/133ML
1 ENEMA RECTAL DAILY PRN
Status: CANCELLED | OUTPATIENT
Start: 2023-11-21

## 2023-11-21 RX ORDER — INSULIN GLARGINE 100 [IU]/ML
10 INJECTION, SOLUTION SUBCUTANEOUS NIGHTLY
Status: CANCELLED | OUTPATIENT
Start: 2023-11-21

## 2023-11-21 RX ORDER — OXYCODONE AND ACETAMINOPHEN 7.5; 325 MG/1; MG/1
2 TABLET ORAL EVERY 4 HOURS PRN
Status: DISCONTINUED | OUTPATIENT
Start: 2023-11-21 | End: 2023-11-28 | Stop reason: HOSPADM

## 2023-11-21 RX ORDER — TAMSULOSIN HYDROCHLORIDE 0.4 MG/1
0.4 CAPSULE ORAL DAILY
Status: CANCELLED | OUTPATIENT
Start: 2023-11-22

## 2023-11-21 RX ORDER — SODIUM CHLORIDE 0.9 % (FLUSH) 0.9 %
5-40 SYRINGE (ML) INJECTION EVERY 12 HOURS SCHEDULED
Status: DISCONTINUED | OUTPATIENT
Start: 2023-11-21 | End: 2023-11-25

## 2023-11-21 RX ORDER — BISACODYL 10 MG
10 SUPPOSITORY, RECTAL RECTAL DAILY PRN
Status: DISCONTINUED | OUTPATIENT
Start: 2023-11-21 | End: 2023-11-28 | Stop reason: HOSPADM

## 2023-11-21 RX ORDER — OXYCODONE AND ACETAMINOPHEN 7.5; 325 MG/1; MG/1
2 TABLET ORAL EVERY 4 HOURS PRN
Status: CANCELLED | OUTPATIENT
Start: 2023-11-21

## 2023-11-21 RX ORDER — TAMSULOSIN HYDROCHLORIDE 0.4 MG/1
0.4 CAPSULE ORAL DAILY
Status: DISCONTINUED | OUTPATIENT
Start: 2023-11-22 | End: 2023-11-28 | Stop reason: HOSPADM

## 2023-11-21 RX ORDER — LOSARTAN POTASSIUM 50 MG/1
50 TABLET ORAL DAILY
Status: DISCONTINUED | OUTPATIENT
Start: 2023-11-22 | End: 2023-11-28 | Stop reason: HOSPADM

## 2023-11-21 RX ORDER — SODIUM CHLORIDE 9 MG/ML
INJECTION, SOLUTION INTRAVENOUS PRN
Status: CANCELLED | OUTPATIENT
Start: 2023-11-21

## 2023-11-21 RX ORDER — ACETAMINOPHEN 325 MG/1
650 TABLET ORAL EVERY 4 HOURS PRN
Status: DISCONTINUED | OUTPATIENT
Start: 2023-11-21 | End: 2023-11-28 | Stop reason: HOSPADM

## 2023-11-21 RX ORDER — ALOGLIPTIN 6.25 MG/1
6.25 TABLET, FILM COATED ORAL DAILY
Status: DISCONTINUED | OUTPATIENT
Start: 2023-11-22 | End: 2023-11-22 | Stop reason: SDUPTHER

## 2023-11-21 RX ORDER — OXYCODONE AND ACETAMINOPHEN 7.5; 325 MG/1; MG/1
1 TABLET ORAL EVERY 4 HOURS PRN
Status: DISCONTINUED | OUTPATIENT
Start: 2023-11-21 | End: 2023-11-28 | Stop reason: HOSPADM

## 2023-11-21 RX ORDER — DOCUSATE SODIUM 100 MG/1
100 CAPSULE, LIQUID FILLED ORAL 2 TIMES DAILY PRN
Status: DISCONTINUED | OUTPATIENT
Start: 2023-11-21 | End: 2023-11-28 | Stop reason: HOSPADM

## 2023-11-21 RX ORDER — HEPARIN SODIUM 5000 [USP'U]/ML
5000 INJECTION, SOLUTION INTRAVENOUS; SUBCUTANEOUS EVERY 8 HOURS SCHEDULED
Status: DISCONTINUED | OUTPATIENT
Start: 2023-11-21 | End: 2023-11-28 | Stop reason: HOSPADM

## 2023-11-21 RX ORDER — HEPARIN SODIUM 5000 [USP'U]/ML
5000 INJECTION, SOLUTION INTRAVENOUS; SUBCUTANEOUS EVERY 8 HOURS SCHEDULED
Status: CANCELLED | OUTPATIENT
Start: 2023-11-21

## 2023-11-21 RX ORDER — ONDANSETRON 2 MG/ML
4 INJECTION INTRAMUSCULAR; INTRAVENOUS EVERY 6 HOURS PRN
Status: CANCELLED | OUTPATIENT
Start: 2023-11-21

## 2023-11-21 RX ORDER — SODIUM CHLORIDE 0.9 % (FLUSH) 0.9 %
5-40 SYRINGE (ML) INJECTION PRN
Status: DISCONTINUED | OUTPATIENT
Start: 2023-11-21 | End: 2023-11-28 | Stop reason: HOSPADM

## 2023-11-21 RX ORDER — LOSARTAN POTASSIUM 50 MG/1
50 TABLET ORAL DAILY
Status: CANCELLED | OUTPATIENT
Start: 2023-11-22

## 2023-11-21 RX ADMIN — SODIUM BICARBONATE 650 MG TABLET 1300 MG: at 08:59

## 2023-11-21 RX ADMIN — HEPARIN SODIUM 5000 UNITS: 5000 INJECTION INTRAVENOUS; SUBCUTANEOUS at 05:32

## 2023-11-21 RX ADMIN — HEPARIN SODIUM 5000 UNITS: 5000 INJECTION INTRAVENOUS; SUBCUTANEOUS at 20:34

## 2023-11-21 RX ADMIN — ALOGLIPTIN 6.25 MG: 6.25 TABLET, FILM COATED ORAL at 08:59

## 2023-11-21 RX ADMIN — INSULIN GLARGINE 10 UNITS: 100 INJECTION, SOLUTION SUBCUTANEOUS at 20:35

## 2023-11-21 RX ADMIN — SODIUM BICARBONATE 650 MG TABLET 1300 MG: at 20:29

## 2023-11-21 RX ADMIN — SODIUM CHLORIDE, PRESERVATIVE FREE 10 ML: 5 INJECTION INTRAVENOUS at 20:41

## 2023-11-21 RX ADMIN — LOSARTAN POTASSIUM 50 MG: 50 TABLET, FILM COATED ORAL at 08:59

## 2023-11-21 RX ADMIN — SODIUM CHLORIDE, PRESERVATIVE FREE 10 ML: 5 INJECTION INTRAVENOUS at 09:00

## 2023-11-21 RX ADMIN — TAMSULOSIN HYDROCHLORIDE 0.4 MG: 0.4 CAPSULE ORAL at 08:59

## 2023-11-21 ASSESSMENT — PAIN SCALES - GENERAL
PAINLEVEL_OUTOF10: 0
PAINLEVEL_OUTOF10: 4

## 2023-11-21 ASSESSMENT — PAIN DESCRIPTION - LOCATION: LOCATION: KNEE;LEG

## 2023-11-21 ASSESSMENT — PAIN DESCRIPTION - DESCRIPTORS: DESCRIPTORS: DISCOMFORT;SORE

## 2023-11-21 ASSESSMENT — PAIN DESCRIPTION - ORIENTATION: ORIENTATION: LEFT

## 2023-11-21 ASSESSMENT — PAIN SCALES - WONG BAKER: WONGBAKER_NUMERICALRESPONSE: 0

## 2023-11-21 ASSESSMENT — PAIN DESCRIPTION - FREQUENCY: FREQUENCY: INTERMITTENT

## 2023-11-21 ASSESSMENT — PAIN DESCRIPTION - ONSET: ONSET: GRADUAL

## 2023-11-21 ASSESSMENT — PAIN DESCRIPTION - PAIN TYPE: TYPE: SURGICAL PAIN

## 2023-11-21 NOTE — PROGRESS NOTES
PHYSICAL THERAPY EXAMINATION    Time In: 0  Time Out: 1620  Total Treatment Time:  48 Minutes         HPI:  per MD report  67 yo M pmh HTN, CKD4, DM2, R BKA who suffered a left quadriceps tendon rupture and has a right leg partial amputation. Patient has previously had a quadriceps tendon repair years ago. Patient had home health physical therapy come to his house prior to the operation to assess for the ability to return home and it was documented that inpatient rehab is advised and medically necessary. Patient presented 11/15/23 to have the Left Quadriceps tendon repair surgery and admitted.     Past Medical History:   Past Medical History:   Diagnosis Date    CKD (chronic kidney disease) stage 4, GFR 15-29 ml/min ([facility])     per patient    Current use of insulin ([address]) 9/24/2015    glargline only at night    Depression 9/24/2015    Hemorrhoids 9/24/2015    Hypertension     controlled w/ medication    Renal failure 9/24/2015    Type II diabetes mellitus ([address]) 9/24/2015    avg fasting bs: 100-110; glargine insulin only at night; A1C 11/3/23 = 5.7       Pt's Goal:  Patient reporting he wants to be able to go up/down his steps at home so he can upstairs to his bedroom and full bathroom      Precautions:  Falls and WB: TDWB LLE, LLE knee brace on at all times locked in extension    Impairments:    Decreased strength L LE  [x]     Decreased strength trunk/core  []     Decreased AROM LLE  [x]     Decreased PROM LLE  [x]    Decreased endurance  []     Decreased balance sitting  []     Decreased balance standing  [x]     Pain   []     Slow ambulation velocity  [x]    Decreased coordination  []    Decreased safety awareness  []      Functional Limitations:   Decreased independence with bed mobility  []     Decreased independence with functional transfers  [x]     Decreased independence with ambulation  [x]     Decreased independence with stair negotiation  [x]       Previous Functional Level: Patient reporting prior to Proprioception Intact Light Touch Intact and Proprioception Intact   Strength  Ankle DF 5/5, PF +2/5, knee NT due to precautions, hip 2/5 to +2/5 Hip and knee 5/5         PRIMARY MODE OF LOCOMOTION: Household Ambulation Only    Functional Assessment:    Balance  Comments   Static Sitting Normal:  Pt. able to maintain balance w/o UE support    Dynamic Sitting Normal - accepts maximal challenge & can shift weight easily fully in all directions    Static Standing Fair:  Pt. requires UE support;  may need occasional min A With RW TDWB LLE and R BKA prosthesis   Dynamic Standing Fair - accepts minimal challenge;  can maintain balance while turning head/trunk With RW TDWB LLE and R BKA prosthesis   Picking Up Object From Floor 88     Not attempted due to medical condition or safety concerns (NT due to LLE limitations of TDWB and left knee brace locked in extension)         BED MOBILITY & TRANSFERS Quality Indicator Score Comments   Rolling 6  Independent     Increased time and effort to complete with cues for body mechanics, Using Ue's to assist LLE    Supine to Sit 6  Independent     Increased time and effort to complete with cues for body mechanics, Using Ue's to assist LLE      Sit to Supine 6  Independent     Increased time and effort to complete with cues for body mechanics, Using Ue's to assist LLE    Sit to Stand 4  Supervision or touching assistance (with RW, R BKA prosthesis, LLE knee brace locked in extension and TDWB LLE)        Bed to/from Chair 4  Supervision or touching assistance (with RW, R BKA prosthesis, LLE knee brace locked in extension and TDWB LLE)        Car Transfer 10     Not attempted due to environmental limitations (Patient too tall and unable to flex left knee due to precautions to fit in rehab car)         [facility] [place] MOBILITY/MANAGEMENT Initial Assessment/Quality Indicator Score Comments   Able to Propel 50' w/ 2 Turns 6  Independent        Able to Propel 150' 6  Independent        Wheelchair

## 2023-11-21 NOTE — PROGRESS NOTES
TRANSFER - OUT REPORT:    Verbal report given to Adry Tovar RN on [employer]  being transferred to Inpatient Rehab 9th for routine progression of patient care       Report consisted of patient's Situation, Background, Assessment and   Recommendations(SBAR). Information from the following report(s) Nurse Handoff Report was reviewed with the receiving nurse. Lines:   Peripheral IV 11/16/23 Left Forearm (Active)   Site Assessment Clean, dry & intact 11/21/23 0859   Line Status Flushed;Normal saline locked; Capped 11/21/23 0859   Line Care Cap changed; Connections checked and tightened 11/21/23 0859   Phlebitis Assessment No symptoms 11/21/23 0859   Infiltration Assessment 0 11/21/23 0859   Alcohol Cap Used Yes 11/21/23 0859   Dressing Status Clean, dry & intact 11/21/23 0859   Dressing Type Transparent 11/21/23 0859        Opportunity for questions and clarification was provided.

## 2023-11-21 NOTE — DISCHARGE SUMMARY
DC Summary:    Patient ID:  Sheryl Jennings  [id number]   89 y.o.  1960    Admit date: 11/16/2023    Discharge Date: 11/21/2023      Admitting Physician: Louis Jacobs MD     Discharge Physician: ELENI Hnaley    Admission Diagnoses: Left knee pain, unspecified chronicity [M25.562]  Rupture of left quadriceps tendon, initial encounter [S76.112A]  Quadriceps tendon rupture, left, initial encounter [S76.112A]    Last Procedure: Procedure(s):  LEFT KNEE QUADRICEPS TENDON REPAIR -REGIONAL BLOCK    Discharge Diagnoses: Principal Problem:    Rupture of left quadriceps tendon  Active Problems:    Left knee pain    Quadriceps tendon rupture, left, initial encounter  Resolved Problems:    * No resolved hospital problems. *       Consults: none    Significant Diagnostic Studies: labs: hgb 7.0     Hospital Course:   Hospital course was complicated by prior contralateral BKA, which added 4 days to the patient's length of stay. Disposition: Rehab    Patient Instructions:   Current Discharge Medication List        CONTINUE these medications which have NOT CHANGED    Details   epoetin burke-epbx (RETACRIT) 77775 UNIT/ML SOLN injection Inject 1 mL into the skin every 30 days  Qty: 1 mL, Refills: 11      losartan (COZAAR) 50 MG tablet Take 1 tablet by mouth daily      sodium bicarbonate 650 MG tablet Take 2 tablets by mouth 2 times daily      insulin glargine (BASAGLAR KWIKPEN) 100 UNIT/ML injection pen Inject 20 Units into the skin nightly  Qty: 5 Adjustable Dose Pre-filled Pen Syringe, Refills: 0    Associated Diagnoses: Type 2 diabetes mellitus with hyperglycemia, with long-term current use of insulin ([address])      ! !  Accu-Chek Multiclix Lancets MISC 1 applicator by Does not apply route daily  Qty: 100 each, Refills: 3    Associated Diagnoses: Type 2 diabetes mellitus with hyperglycemia, with long-term current use of insulin ([facility])      blood glucose test strips (ASCENSIA AUTODISC VI;ONE TOUCH ULTRA TEST VI) strip 1 each by In Vitro route daily As needed. Qty: 100 each, Refills: 3    Associated Diagnoses: Type 2 diabetes mellitus with hyperglycemia, with long-term current use of insulin ([facility])      Insulin Pen Needle 29G X 12MM MISC 1 each by Does not apply route daily  Qty: 100 each, Refills: 3    Associated Diagnoses: Type 2 diabetes mellitus with hyperglycemia, with long-term current use of insulin ([facility])      Lancet Devices (ONETOUCH DELICA SAFETY LANCING) MISC 1 each by Does not apply route daily  Qty: 100 each, Refills: 3      !! OneTouch Delica Lancets 62J MISC 1 each by Does not apply route daily  Qty: 100 each, Refills: 3      alogliptin (NESINA) 25 MG TABS tablet Take 1 tablet by mouth daily  Qty: 90 tablet, Refills: 1    Associated Diagnoses: Type 2 diabetes mellitus with hyperglycemia, without long-term current use of insulin ([facility])      tamsulosin (FLOMAX) 0.4 MG capsule Take 1 capsule by mouth daily TAKE 1 CAPSULE BY MOUTH ONCE DAILY  Qty: 90 capsule, Refills: 1    Associated Diagnoses: Benign prostatic hyperplasia without lower urinary tract symptoms      loperamide (IMODIUM A-D) 2 MG tablet Take 1 tablet by mouth 4 times daily as needed       !! - Potential duplicate medications found. Please discuss with provider. STOP taking these medications       HYDROcodone-acetaminophen (NORCO) 5-325 MG per tablet Comments:   Reason for Stopping:               Diet: Reference my discharge instructions. Activity: Reference my discharge instructions. No follow-ups on file. Total time discharging patient took greater than 30 minutes.     ELENI Vences  November 21, 2023  11:07 AM

## 2023-11-21 NOTE — CARE COORDINATION
Insurance approval received.  Pt is medically cleared for dc and will transfer to [facility] today for acute inpatient rehab services.  Pt will notify his family.  CM remains available to assist as needed.       11/21/23 3914   Service Assessment   Patient Orientation Alert and Oriented   Cognition Alert   History Provided By Medical Record;Patient   Primary Caregiver Self   Support Systems Family Members   Patient's Healthcare Decision Maker is: Legal Next of Kin   PCP Verified by CM Yes   Last Visit to PCP Within last 3 months   Prior Functional Level Independent in ADLs/IADLs   Current Functional Level Assistance with the following:;Mobility   Can patient return to prior living arrangement No   Ability to make needs known: Good   Family able to assist with home care needs: No   Would you like for me to discuss the discharge plan with any other family members/significant others, and if so, who? No   Financial Resources Medicaid   Community Resources None   Social/Functional History   Lives With Family  (Brother)   Type of Home House   Home Layout Two level;Bed/Bath upstairs   Home Access Stairs to enter without rails   Entrance Stairs - Number of Steps 2   ADL Assistance Independent   Homemaking Assistance Independent   Homemaking Responsibilities Yes   Ambulation Assistance Independent   Transfer Assistance Independent   Active  Yes   Occupation Full time employment  ()   Discharge Planning   Type of Residence Acute Rehab   Living Arrangements Alone   Current Services Prior To Admission Home Care   Potential Assistance Needed Extended Care Facility   DME Ordered? No   Potential Assistance Purchasing Medications No   Type of Home Care Services None   Patient expects to be discharged to: Rehabilitation facility   Services At/After Discharge   Transition of Care Consult (CM Consult) Discharge Planning;Acute Rehab   Services At/After Discharge OT;PT;Inpatient rehab;Nursing services;Aide services     Resource Information Provided? No   Mode of Transport at Discharge   (family)   Confirm Follow Up Transport Family   Condition of Participation: Discharge Planning   The Plan for Transition of Care is related to the following treatment goals: Acute inpatient rehab to improve pt's strength, mobility and functional abilities. The Patient and/or Patient Representative was provided with a Choice of Provider? Patient   The Patient and/Or Patient Representative agree with the Discharge Plan? Yes   Freedom of Choice list was provided with basic dialogue that supports the patient's individualized plan of care/goals, treatment preferences, and shares the quality data associated with the providers?   Yes

## 2023-11-21 NOTE — PROGRESS NOTES
TRANSFER - IN REPORT:    Verbal report received from [facility] on [employer]  being received from 718 for routine progression of patient care      Report consisted of patient's Situation, Background, Assessment and   Recommendations(SBAR). Information from the following report(s) Nurse Handoff Report was reviewed with the receiving nurse. Opportunity for questions and clarification was provided. Assessment completed upon patient's arrival to unit and care assumed.

## 2023-11-21 NOTE — H&P
hours a day, at least 5 out of 7 days per week to address ADLs (bathing, LE dressing, toileting) and adaptive equipment as needed. - Continue Speech Therapy prn for: dysarthria, impaired communication skills; therapy schedule may be adjusted by MD based on patient's needs. Each of these therapies will be continued as above for the duration of the inpatient rehab stay. The patient will also require 24-hour skilled rehabilitation nursing for bowel and bladder management, skin care for decubitus ulcer prevention, pain management and ongoing medication administration. Plan / Recommendations / Medical Decision Making:     Daily physician / PA medical management:    Quadriceps tendon rupture, left, initial encounter [X42.943I] - No reported post op complications. Start interdisciplinary approach to rehabilitation including PT, OT, nursing and physiatry and disease specific education. -TTWB LLE in a knee immobilizer until advanced by Orthopedics.  -Pain management with Tylenol prn, and Percocet 7.5 -15mg q4h prn. Pt reports mild to moderate post op pain. Hypertension - BP managed medically on Losartan 50mg daily. Adjust prn. Diabetes mellitus - HgbA1c 5.7% on 11/3/23; impaired / poor glycemic control. Will require ACHS glucose monitoring and medication adjustment to optimize control.   -Continue home Alogliptin 6.25mg daily, Glargine insulin 10U qhs, SSI prn, and a regular diet with thins. BPH - Continue Flomax 0.4mg daily. Severe Anemia - Most recent H/H 7.0/22.9 on 11/21/23. Follow with surveillance labs. Pneumonia prophylaxis - incentive spirometer every hour while awake. DVT risk / DVT prophylaxis - mobilize as tolerated. SCDs when in bed; NELY hose when out of bed. Heparin subcutaneously q8h     Electrolyte Impairment - Had Metabolic Acidosis with HCO3- as low as 12. Continue Sodium Bicarbonate 1300mg BID. GI prophylaxis - At times may need antacids, Maalox PRN.     General skin care / wound prevention - monitor left knee incision and general skin status daily. At risk for failure due to impaired mobility. Daily checks, keep wound clean and dry, reinforce dressing PRN, and ice to area for comfort. Offload <q2Hrs. Bladder program / urinary retention / neurogenic bladder - schedule voids q6-8h prn. Check post-void residual as needed; in-and-out catheter if post-void residual is more than 400ml. Pt reports baseline bladder function without incontinence, OAB symptoms, or urgency on IPR admission. Bowel program - at risk for constipation as a side effect of opioids, other medications, impaired mobility, etc. MiraLAX daily for regularity, Senokot-S for stool softener + laxative. PRN MOM, bisacodyl suppository or tablets for constipation. Pt reports recent diarrhea/loose stools with some incontinence on IPR admission. He has Loperamide prn available. Disposition: The patient's prognosis for significant practical improvement within a reasonable period of time appears good and the estimated length of stay is 7 days; patient is expected to return home with brother's family supervision and continued rehabilitation with home health therapy. Follow Up:  Dr. Sunshine Harrison MD (PCP) 1-2 weeks post IPR  Dr. Dar Hoang MD (Orthopedic Surgery) per his f/u scheduling    Given the patient's complex neurologic / medical condition, risks of further medical complications include but are not limited to: thromboembolism / pulmonary embolism, skin breakdown, pneumonia due to decreased mobility, CVA, MI, cardiac arrhythmias due to HTN, postural hypotension, respiratory compromise/failure, infection. For these ongoing medical issues, rehabilitation services could not be safely provided at a lower level of care such as a skilled nursing facility or nursing home.         Signed By: Mamta Luque MD     November 21, 2023

## 2023-11-21 NOTE — PROGRESS NOTES
ACUTE PHYSICAL THERAPY GOALS:   (Developed with and agreed upon by patient and/or caregiver.)    (1.) [facility]  will move from supine to sit and sit to supine  with INDEPENDENCE within 7 treatment day(s). (2.) [facility] will transfer from sit to stand and stand to sit with SUPERVISION using the least restrictive device within 7 treatment day(s). (2.) [facility] will transfer from bed to chair and chair to bed with SUPERVISION using the least restrictive device within 7 treatment day(s). (3.) [facility] will perform standing static and dynamic balance activities x 15 minutes with STAND BY ASSIST to improve safety within 7 treatment day(s). (4.) [facility] will perform therapeutic exercises x 20 min for HEP with INDEPENDENCE to improve strength, endurance, and functional mobility within 7 treatment day(s). PHYSICAL THERAPY: Daily Note AM   (Link to Caseload Tracking: PT Visit Days : 5  Time In/Out PT Charge Capture  Rehab Caseload Tracker  Orders    L LE TDWB FOR TRANSFERS ONLY, BRACE LOCKED IN [address] [facility] is a 66 y.o. male   PRIMARY DIAGNOSIS: Rupture of left quadriceps tendon  Left knee pain, unspecified chronicity [M25.562]  Rupture of left quadriceps tendon, initial encounter [S76.112A]  Quadriceps tendon rupture, left, initial encounter [S76.112A]  Procedure(s) (LRB):  LEFT KNEE QUADRICEPS TENDON REPAIR -REGIONAL BLOCK (Left)  5 Days Post-Op  Inpatient: Payor: ABSOLUTE TOTAL CARE MEDICAID / Plan: ABSOLUTE TOTAL CARE MEDICAID / Product Type: *No Product type* /     ASSESSMENT:     REHAB RECOMMENDATIONS:   Recommendation to date pending progress:  Setting:  Inpatient Rehab Facility    Equipment:    To Be Determined     ASSESSMENT:  Mr. Toni Murphy is making good progress toward goals with increased mobility and gait distances. He is able to perform bed mobility and gait with the walker with SBA.   Today worked on crutch training as the

## 2023-11-21 NOTE — PROGRESS NOTES
seated/standing at sink. Pt did well with tasks. Continue to be making good progress with goals. Pt is to be discharged from hospital today.        SUBJECTIVE:     Mr. Mushtaq Archuleta states, \"I got over to commode by myself\"     Social/Functional Lives With: Family (Brother)  Type of Home: House  Home Layout: Two level, Bed/Bath upstairs  Home Access: Stairs to enter without rails  Entrance Stairs - Number of Steps: 2  Has the patient had two or more falls in the past year or any fall with injury in the past year?: Yes  ADL Assistance: [address] Independent  Homemaking Responsibilities: Yes  Ambulation Assistance: Independent  Transfer Assistance: Independent  Active : Yes  Occupation: Full time employment ()    OBJECTIVE:     Daly Pringle / [place] / Leticia Hammond: VIOLA    RESTRICTIONS/PRECAUTIONS:  Restrictions/Precautions  Restrictions/Precautions: Fall Risk, Weight Bearing  Required Braces or Orthoses?: Yes  Lower Extremity Weight Bearing Restrictions  Left Lower Extremity Weight Bearing: Toe Touch Weight Bearing  Required Braces or Orthoses  Left Lower Extremity Brace: Knee Brace (locked in extension)        PAIN: VITALS / O2:   Pre Treatment:    0        Post Treatment: 0 Vitals          Oxygen        MOBILITY: I Mod I S SBA CGA Min Mod Max Total  NT x2 Comments:   Bed Mobility    Rolling [] [] [] [] [] [] [] [] [] [x] []    Supine to Sit [] [] [] [] [] [] [] [] [] [] []    Scooting [] [] [] [] [] [] [] [] [] [x] []    Sit to Supine [] [] [] [] [] [] [] [] [] [x] []    Transfers    Sit to Stand [] [] [x] [] [] [] [] [] [] [] []    Bed to Chair [] [] [] [] [] [] [] [] [] [x] []    Stand to Sit [] [] [x] [] [] [] [] [] [] [] []    Tub/Shower [] [] [] [] [] [] [] [] [] [x] []     Toilet [] [] [] [] [] [] [] [] [] [x] []      [] [] [] [] [] [] [] [] [] [] []    I=Independent, Mod I=Modified Independent, S=Supervision/Setup, SBA=Standby Assistance, CGA=Contact Guard Assistance, Min=Minimal Assistance, Mod=Moderate Assistance, Max=Maximal Assistance, Total=Total Assistance, NT=Not Tested    ACTIVITIES OF DAILY LIVING: I Mod I S SBA CGA Min Mod Max Total NT Comments   BASIC ADLs:              Upper Body   Bathing [] [] [x] [] [] [] [] [] [] []     Lower Body Bathing [] [] [x] [] [] [] [] [] [] []     Toileting [] [] [x] [] [] [] [] [] [] []    Upper Body Dressing [] [] [x] [] [] [] [] [] [] []    Lower Body Dressing [] [] [x] [] [] [] [] [] [] []    Feeding [] [] [] [] [] [] [] [] [] [x]    Grooming [] [] [x] [] [] [] [] [] [] []    Personal Device Care [] [] [] [] [] [] [] [] [] [x]    Functional Mobility [] [] [x] [] [] [] [] [] [] []    I=Independent, Mod I=Modified Independent, S=Supervision/Setup, SBA=Standby Assistance, CGA=Contact Guard Assistance, Min=Minimal Assistance, Mod=Moderate Assistance, Max=Maximal Assistance, Total=Total Assistance, NT=Not Tested    BALANCE: Good Fair+ Fair Fair- Poor NT Comments   Sitting Static [x] [] [] [] [] []    Sitting Dynamic [x] [] [] [] [] []              Standing Static [] [x] [] [] [] []    Standing Dynamic [] [x] [] [] [] []        PLAN:     FREQUENCY/DURATION   OT Plan of Care: 5 times/week for duration of hospital stay or until stated goals are met, whichever comes first.    TREATMENT:     TREATMENT:   Self Care (12 minutes): Patient participated in upper body bathing, lower body bathing, upper body dressing, lower body dressing, and grooming ADLs in supported sitting and standing with no verbal, manual, and tactile cueing to increase independence, decrease assistance required, increase activity tolerance, and maintain precautions. Patient also participated in functional transfer training to increase independence, decrease assistance required, increase activity tolerance, and maintain precautions.      TREATMENT GRID:  N/A    AFTER TREATMENT PRECAUTIONS: Bed/Chair Locked, Call light within reach, Chair, and Needs within reach    INTERDISCIPLINARY

## 2023-11-22 ENCOUNTER — CARE COORDINATION (OUTPATIENT)
Dept: CARE COORDINATION | Facility: CLINIC | Age: 63
End: 2023-11-22

## 2023-11-22 PROBLEM — G89.18 POST-OP PAIN: Status: ACTIVE | Noted: 2023-11-22

## 2023-11-22 PROBLEM — Z89.511 HX OF RIGHT BKA (HCC): Status: ACTIVE | Noted: 2023-11-22

## 2023-11-22 PROBLEM — D64.9 SEVERE ANEMIA: Status: ACTIVE | Noted: 2023-11-22

## 2023-11-22 LAB
GLUCOSE BLD STRIP.AUTO-MCNC: 126 MG/DL (ref 65–100)
GLUCOSE BLD STRIP.AUTO-MCNC: 162 MG/DL (ref 65–100)
GLUCOSE BLD STRIP.AUTO-MCNC: 87 MG/DL (ref 65–100)
GLUCOSE BLD STRIP.AUTO-MCNC: 96 MG/DL (ref 65–100)
SERVICE CMNT-IMP: ABNORMAL
SERVICE CMNT-IMP: ABNORMAL
SERVICE CMNT-IMP: NORMAL
SERVICE CMNT-IMP: NORMAL

## 2023-11-22 PROCEDURE — 1180000000 HC REHAB R&B

## 2023-11-22 PROCEDURE — 6360000002 HC RX W HCPCS: Performed by: PHYSICAL MEDICINE & REHABILITATION

## 2023-11-22 PROCEDURE — 97535 SELF CARE MNGMENT TRAINING: CPT

## 2023-11-22 PROCEDURE — 97165 OT EVAL LOW COMPLEX 30 MIN: CPT

## 2023-11-22 PROCEDURE — 97110 THERAPEUTIC EXERCISES: CPT

## 2023-11-22 PROCEDURE — 82962 GLUCOSE BLOOD TEST: CPT

## 2023-11-22 PROCEDURE — 2580000003 HC RX 258: Performed by: PHYSICAL MEDICINE & REHABILITATION

## 2023-11-22 PROCEDURE — 6370000000 HC RX 637 (ALT 250 FOR IP): Performed by: PHYSICAL MEDICINE & REHABILITATION

## 2023-11-22 PROCEDURE — 97530 THERAPEUTIC ACTIVITIES: CPT

## 2023-11-22 PROCEDURE — 97116 GAIT TRAINING THERAPY: CPT

## 2023-11-22 RX ORDER — ALOGLIPTIN 6.25 MG/1
6.25 TABLET, FILM COATED ORAL DAILY
Status: DISCONTINUED | OUTPATIENT
Start: 2023-11-22 | End: 2023-11-28 | Stop reason: HOSPADM

## 2023-11-22 RX ORDER — ALOGLIPTIN 6.25 MG/1
6.25 TABLET, FILM COATED ORAL DAILY
Status: DISCONTINUED | OUTPATIENT
Start: 2023-11-22 | End: 2023-11-22 | Stop reason: SDUPTHER

## 2023-11-22 RX ADMIN — SODIUM CHLORIDE, PRESERVATIVE FREE 10 ML: 5 INJECTION INTRAVENOUS at 21:32

## 2023-11-22 RX ADMIN — SODIUM BICARBONATE 650 MG TABLET 1300 MG: at 21:32

## 2023-11-22 RX ADMIN — SODIUM BICARBONATE 650 MG TABLET 1300 MG: at 08:59

## 2023-11-22 RX ADMIN — HEPARIN SODIUM 5000 UNITS: 5000 INJECTION INTRAVENOUS; SUBCUTANEOUS at 14:50

## 2023-11-22 RX ADMIN — TAMSULOSIN HYDROCHLORIDE 0.4 MG: 0.4 CAPSULE ORAL at 08:59

## 2023-11-22 RX ADMIN — HEPARIN SODIUM 5000 UNITS: 5000 INJECTION INTRAVENOUS; SUBCUTANEOUS at 06:05

## 2023-11-22 RX ADMIN — HEPARIN SODIUM 5000 UNITS: 5000 INJECTION INTRAVENOUS; SUBCUTANEOUS at 21:31

## 2023-11-22 RX ADMIN — LOSARTAN POTASSIUM 50 MG: 50 TABLET, FILM COATED ORAL at 08:59

## 2023-11-22 RX ADMIN — INSULIN GLARGINE 10 UNITS: 100 INJECTION, SOLUTION SUBCUTANEOUS at 21:30

## 2023-11-22 RX ADMIN — ALOGLIPTIN 6.25 MG: 6.25 TABLET, FILM COATED ORAL at 11:36

## 2023-11-22 ASSESSMENT — PAIN SCALES - WONG BAKER: WONGBAKER_NUMERICALRESPONSE: 0

## 2023-11-22 ASSESSMENT — PAIN SCALES - GENERAL: PAINLEVEL_OUTOF10: 0

## 2023-11-22 NOTE — PROGRESS NOTES
[facility] OCCUPATIONAL THERAPY INITIAL EVALUATION    Time In 0710      Time Out 0815        Precautions:  LLE TDWB with knee brace on at at times and locked into extension, RLE BKA with prosthetic     Pain: Pt has no complaints of pain. History of Presenting Illness (per previous reports):   per MD report  67 yo M pmh HTN, CKD4, DM2, R BKA who suffered a left quadriceps tendon rupture and has a right leg partial amputation. Patient has previously had a quadriceps tendon repair years ago. Patient had home health physical therapy come to his house prior to the operation to assess for the ability to return home and it was documented that inpatient rehab is advised and medically necessary. Patient presented 11/15/23 to have the Left Quadriceps tendon repair surgery and admitted. Past Medical History/ Co-morbidities:  Past Medical History:   Diagnosis Date    CKD (chronic kidney disease) stage 4, GFR 15-29 ml/min ([facility])     per patient    Current use of insulin ([address]) 9/24/2015    glargline only at night    Depression 9/24/2015    Hemorrhoids 9/24/2015    Hypertension     controlled w/ medication    Renal failure 9/24/2015    Type II diabetes mellitus ([address]) 9/24/2015    avg fasting bs: 100-110; glargine insulin only at night; A1C 11/3/23 = 5.7       Patient's Goal: \"I want to be able to navigate my stairs and get in and out of the shower by myself. \"    Prior Level of Function: Pt reports previous independence in ADLs, IADLs, driving, and walking without a device with R prosthesis donned. Pt works as a  but has not worked since June 2023.      [address] [facility] Entrance 2 MIGDALIA no handrails   Lives Alone No   Support Systems Lives with brother   Bathroom Situation  Tub/Shower Combo and Curtain   Current DME Rolling Walker and transport wc     Upper Extremity Function   RUE LUE   General Evaluation WFL WFL   Fine Motor Coordination Intact Intact   Gross Motor

## 2023-11-22 NOTE — CARE COORDINATION
No transitions of care outreach indicated at this time. Patient was discharged to [facility].

## 2023-11-22 NOTE — PROGRESS NOTES
OT Daily Note  Time In:    0918     Time Out: 1030        Subjective: \"I tore my R bicep 3 or 4 years ago but never had surgery. \"  Pain: not expressed  Education: BUE strengthening exercises  Interdisciplinary Communication: with PT regarding stairs    Mobility   Score Comments   Sit to Stand  SBA    Transfer Assist  SBA Ambulating with RW     Strengthening   Pt completed 20 minutes on the ergometer frontwards and backwards with medium heavy resistance to increase UB strength and activity tolerance for integration into functional transfers. Pt completed x10 reps x4 sets x60 lbs on [employer] working on BUE strength for sit > stand transfers. Pt completed the following UB exercises using 3 and 5 lb free weights x10 to 20 reps each: chest press, chest pulls, butterfly wings, front raise, upright row, overhead press, bicep curls, tricep curls. Assessment: Discussed pt's shower setup and his LLE will be able to be out of the shower in full knee extension while seated on ttb in tub/shower at home. Provided resources for pt acquiring a ttb prior to discharge. Pt requested information to be sent to his brother. Pt is progressing well in functional mobility, self care skills, and strength. Plan: Continue OT POC.      Albert Moses, OT   11/22/2023

## 2023-11-23 LAB
GLUCOSE BLD STRIP.AUTO-MCNC: 130 MG/DL (ref 65–100)
GLUCOSE BLD STRIP.AUTO-MCNC: 152 MG/DL (ref 65–100)
GLUCOSE BLD STRIP.AUTO-MCNC: 87 MG/DL (ref 65–100)
GLUCOSE BLD STRIP.AUTO-MCNC: 89 MG/DL (ref 65–100)
SERVICE CMNT-IMP: ABNORMAL
SERVICE CMNT-IMP: ABNORMAL
SERVICE CMNT-IMP: NORMAL
SERVICE CMNT-IMP: NORMAL

## 2023-11-23 PROCEDURE — 97116 GAIT TRAINING THERAPY: CPT

## 2023-11-23 PROCEDURE — 2580000003 HC RX 258: Performed by: PHYSICAL MEDICINE & REHABILITATION

## 2023-11-23 PROCEDURE — 97110 THERAPEUTIC EXERCISES: CPT

## 2023-11-23 PROCEDURE — 6370000000 HC RX 637 (ALT 250 FOR IP): Performed by: PHYSICAL MEDICINE & REHABILITATION

## 2023-11-23 PROCEDURE — 97542 WHEELCHAIR MNGMENT TRAINING: CPT

## 2023-11-23 PROCEDURE — 97530 THERAPEUTIC ACTIVITIES: CPT

## 2023-11-23 PROCEDURE — 6360000002 HC RX W HCPCS: Performed by: PHYSICAL MEDICINE & REHABILITATION

## 2023-11-23 PROCEDURE — 99232 SBSQ HOSP IP/OBS MODERATE 35: CPT | Performed by: PHYSICAL MEDICINE & REHABILITATION

## 2023-11-23 PROCEDURE — 82962 GLUCOSE BLOOD TEST: CPT

## 2023-11-23 PROCEDURE — 1180000000 HC REHAB R&B

## 2023-11-23 RX ADMIN — INSULIN GLARGINE 10 UNITS: 100 INJECTION, SOLUTION SUBCUTANEOUS at 21:52

## 2023-11-23 RX ADMIN — SODIUM BICARBONATE 650 MG TABLET 1300 MG: at 21:51

## 2023-11-23 RX ADMIN — ALOGLIPTIN 6.25 MG: 6.25 TABLET, FILM COATED ORAL at 08:44

## 2023-11-23 RX ADMIN — HEPARIN SODIUM 5000 UNITS: 5000 INJECTION INTRAVENOUS; SUBCUTANEOUS at 21:51

## 2023-11-23 RX ADMIN — TAMSULOSIN HYDROCHLORIDE 0.4 MG: 0.4 CAPSULE ORAL at 08:44

## 2023-11-23 RX ADMIN — SODIUM BICARBONATE 650 MG TABLET 1300 MG: at 08:44

## 2023-11-23 RX ADMIN — LOSARTAN POTASSIUM 50 MG: 50 TABLET, FILM COATED ORAL at 08:44

## 2023-11-23 RX ADMIN — HEPARIN SODIUM 5000 UNITS: 5000 INJECTION INTRAVENOUS; SUBCUTANEOUS at 06:10

## 2023-11-23 RX ADMIN — SODIUM CHLORIDE, PRESERVATIVE FREE 10 ML: 5 INJECTION INTRAVENOUS at 08:45

## 2023-11-23 RX ADMIN — HEPARIN SODIUM 5000 UNITS: 5000 INJECTION INTRAVENOUS; SUBCUTANEOUS at 15:45

## 2023-11-23 NOTE — PROGRESS NOTES
OT Daily Note  Time In 1126   Time Out 1139     Subjective: [How're the arms feeling?] \"Oh they're working\"  Pain: 0/10  Education: N/A  Interdisciplinary Communication: RN, PT    Mobility   Score Comments   Rolling      Supine to Sit      Sit to Supine      Sit to Stand      Transfer Assist        Strengthening    Pt completed UB therapeutic exercise to challenge BUE AROM/STR/coord and activity tolerance in preparation for safe return to max (I) with I/ADLs. Pt exercises graded up. Pt tolerated increase and session well with no c/o pain. Pt required increased rest break duration and frequency throughout. Forward rows; 4lb dowel bar; 3 x 20  Bicep curls; 9lb dowel bar; 3 x 20  Chest press; 4lb dowel bar; 3 x 20  Shoulder raise; 1lb dowel bar; 5 x 8  Shoulder press; 1lb dowel bar; 4 x 10     Activity Tolerance    Pt completed therapeutic activity to challenge BUE AROM/STR/coord and activity tolerance in preparation for safe return to max (I) with I/ADLs. Pt tolerated activity well with no c/o pain. Pt required increased rest break duration and frequency throughout. Pt completed seated boxing with mobile mitts. Pt verbally instructed which hand to use and completed punch to opposing mitt. Pt completed 3 sets, 120-150s with sets 2 and 3 ending with 15 second rapid alternating strike R/L. Pt exhibited 3-5 errors using incorrect hand. 1-3 verbal cues for direction throughout. Assessment: Pt tolerated session well. Pt making progress since [facility]. Plan: Continue OT POC.      Zofia Thapa OT   11/23/2023

## 2023-11-23 NOTE — PLAN OF CARE
Problem: Safety - Adult  Goal: Free from fall injury  11/22/2023 2256 by Carmina Brush RN  Outcome: Progressing  11/22/2023 1428 by Enrrique Mejia RN  Outcome: Progressing  Flowsheets (Taken 11/22/2023 0742 by Vik Khalil, RN)  Free From Fall Injury:   Instruct family/caregiver on patient safety   Based on caregiver fall risk screen, instruct family/caregiver to ask for assistance with transferring infant if caregiver noted to have fall risk factors

## 2023-11-23 NOTE — PROGRESS NOTES
Count    Collection Time: 11/21/23  5:32 AM   Result Value Ref Range    Platelets [id number] - 835 K/uL   POCT Glucose    Collection Time: 11/21/23  6:24 AM   Result Value Ref Range    POC Glucose 110 (H) 65 - 100 mg/dL    Performed by: RonBPL GlobalerChloePCT    POCT Glucose    Collection Time: 11/21/23 11:05 AM   Result Value Ref Range    POC Glucose 74 65 - 100 mg/dL    Performed by: BrynnPCT    POCT Glucose    Collection Time: 11/21/23  4:23 PM   Result Value Ref Range    POC Glucose 116 (H) 65 - 100 mg/dL    Performed by: AlbinaerErinPCT    POCT Glucose    Collection Time: 11/21/23  8:34 PM   Result Value Ref Range    POC Glucose 157 (H) 65 - 100 mg/dL    Performed by: RonBPL GlobalerChloePCT    POCT Glucose    Collection Time: 11/22/23  6:13 AM   Result Value Ref Range    POC Glucose 87 65 - 100 mg/dL    Performed by: RonInnoverneoePCT    POCT Glucose    Collection Time: 11/22/23 11:07 AM   Result Value Ref Range    POC Glucose 96 65 - 100 mg/dL    Performed by: CelestinockerErinPCT    POCT Glucose    Collection Time: 11/22/23  4:04 PM   Result Value Ref Range    POC Glucose 162 (H) 65 - 100 mg/dL    Performed by: CelestinockerErinPCT    POCT Glucose    Collection Time: 11/22/23  8:39 PM   Result Value Ref Range    POC Glucose 126 (H) 65 - 100 mg/dL    Performed by: SylvieCLUDOC - [facility] Healthcare NetworkBrittany    POCT Glucose    Collection Time: 11/23/23  6:10 AM   Result Value Ref Range    POC Glucose 87 65 - 100 mg/dL    Performed by: GageADNBrittany    POCT Glucose    Collection Time: 11/23/23 11:19 AM   Result Value Ref Range    POC Glucose 130 (H) 65 - 100 mg/dL    Performed by: Justice        Assessment: This is a debilitated 66 y.o. Male post left quadriceps tendon rupture s/p repair. This is his second quadriceps tendon rupture and repair, the first time was in 1992. He had a right BKA in 2014 and wears a prosthesis.      The Post Assessment Physician Evaluation (JAIME) found the current functional status to be comparable with the pre-admission screening. Mr. Omayra Denson is a good candidate for acute inpatient rehabilitation. Nothing since the pre-admission screen has changed that determination. Rehabilitation Plan  The patient has shown the ability to tolerate and benefit from 3 hours of therapy daily and is being admitted to a comprehensive acute inpatient rehabilitation program consisting of at least 3 hours of combined physical and occupational therapies. - Begin intensive Physical Therapy for a minimum of 1.5 hours a day, at least 5 out of 7 days per week to address bed mobility, transfers, ambulation, strengthening, balance, and endurance. - Begin intensive Occupational Therapy for a minimum of 1.5 hours a day, at least 5 out of 7 days per week to address ADLs (bathing, LE dressing, toileting) and adaptive equipment as needed. - Continue Speech Therapy prn for: dysarthria, impaired communication skills; therapy schedule may be adjusted by MD based on patient's needs. Each of these therapies will be continued as above for the duration of the inpatient rehab stay. The patient will also require 24-hour skilled rehabilitation nursing for bowel and bladder management, skin care for decubitus ulcer prevention, pain management and ongoing medication administration. Plan / Recommendations / Medical Decision Making:      Daily physician / PA medical management:     Quadriceps tendon rupture, left, initial encounter [J85.068Z] - No reported post op complications. Start interdisciplinary approach to rehabilitation including PT, OT, nursing and physiatry and disease specific education. -TTWB LLE in a knee immobilizer until advanced by Orthopedics.  -Pain management with Tylenol prn, and Percocet 7.5 -15mg q4h prn. Pt reports mild to moderate post op pain. Hypertension - BP managed medically on Losartan 50mg daily. Adjust prn. Diabetes mellitus - HgbA1c 5.7% on 11/3/23; impaired / poor glycemic control.  Will require ACHS

## 2023-11-24 LAB
ANION GAP SERPL CALC-SCNC: 5 MMOL/L (ref 2–11)
BUN SERPL-MCNC: 45 MG/DL (ref 8–23)
CALCIUM SERPL-MCNC: 8.9 MG/DL (ref 8.3–10.4)
CHLORIDE SERPL-SCNC: 117 MMOL/L (ref 101–110)
CO2 SERPL-SCNC: 20 MMOL/L (ref 21–32)
CREAT SERPL-MCNC: 4.1 MG/DL (ref 0.8–1.5)
ERYTHROCYTE [DISTWIDTH] IN BLOOD BY AUTOMATED COUNT: 12.6 % (ref 11.9–14.6)
GLUCOSE BLD STRIP.AUTO-MCNC: 111 MG/DL (ref 65–100)
GLUCOSE BLD STRIP.AUTO-MCNC: 118 MG/DL (ref 65–100)
GLUCOSE SERPL-MCNC: 104 MG/DL (ref 65–100)
HCT VFR BLD AUTO: 25.7 % (ref 41.1–50.3)
HGB BLD-MCNC: 7.9 G/DL (ref 13.6–17.2)
MCH RBC QN AUTO: 30.2 PG (ref 26.1–32.9)
MCHC RBC AUTO-ENTMCNC: 30.7 G/DL (ref 31.4–35)
MCV RBC AUTO: 98.1 FL (ref 82–102)
NRBC # BLD: 0 K/UL (ref 0–0.2)
PLATELET # BLD AUTO: 310 K/UL (ref 150–450)
PMV BLD AUTO: 10.6 FL (ref 9.4–12.3)
POTASSIUM SERPL-SCNC: 5.3 MMOL/L (ref 3.5–5.1)
RBC # BLD AUTO: 2.62 M/UL (ref 4.23–5.6)
SERVICE CMNT-IMP: ABNORMAL
SERVICE CMNT-IMP: ABNORMAL
SODIUM SERPL-SCNC: 142 MMOL/L (ref 133–143)
WBC # BLD AUTO: 7.6 K/UL (ref 4.3–11.1)

## 2023-11-24 PROCEDURE — 6370000000 HC RX 637 (ALT 250 FOR IP): Performed by: PHYSICAL MEDICINE & REHABILITATION

## 2023-11-24 PROCEDURE — 97110 THERAPEUTIC EXERCISES: CPT

## 2023-11-24 PROCEDURE — 97116 GAIT TRAINING THERAPY: CPT

## 2023-11-24 PROCEDURE — 36415 COLL VENOUS BLD VENIPUNCTURE: CPT

## 2023-11-24 PROCEDURE — 6360000002 HC RX W HCPCS: Performed by: PHYSICAL MEDICINE & REHABILITATION

## 2023-11-24 PROCEDURE — 1180000000 HC REHAB R&B

## 2023-11-24 PROCEDURE — 85027 COMPLETE CBC AUTOMATED: CPT

## 2023-11-24 PROCEDURE — 99231 SBSQ HOSP IP/OBS SF/LOW 25: CPT | Performed by: PHYSICAL MEDICINE & REHABILITATION

## 2023-11-24 PROCEDURE — 97530 THERAPEUTIC ACTIVITIES: CPT

## 2023-11-24 PROCEDURE — 82962 GLUCOSE BLOOD TEST: CPT

## 2023-11-24 PROCEDURE — 97535 SELF CARE MNGMENT TRAINING: CPT

## 2023-11-24 PROCEDURE — 2580000003 HC RX 258: Performed by: PHYSICAL MEDICINE & REHABILITATION

## 2023-11-24 PROCEDURE — 80048 BASIC METABOLIC PNL TOTAL CA: CPT

## 2023-11-24 RX ADMIN — SODIUM BICARBONATE 650 MG TABLET 1300 MG: at 08:31

## 2023-11-24 RX ADMIN — LOSARTAN POTASSIUM 50 MG: 50 TABLET, FILM COATED ORAL at 08:31

## 2023-11-24 RX ADMIN — TAMSULOSIN HYDROCHLORIDE 0.4 MG: 0.4 CAPSULE ORAL at 08:31

## 2023-11-24 RX ADMIN — ALOGLIPTIN 6.25 MG: 6.25 TABLET, FILM COATED ORAL at 08:31

## 2023-11-24 RX ADMIN — SODIUM CHLORIDE, PRESERVATIVE FREE 10 ML: 5 INJECTION INTRAVENOUS at 21:51

## 2023-11-24 RX ADMIN — HEPARIN SODIUM 5000 UNITS: 5000 INJECTION INTRAVENOUS; SUBCUTANEOUS at 21:51

## 2023-11-24 RX ADMIN — HEPARIN SODIUM 5000 UNITS: 5000 INJECTION INTRAVENOUS; SUBCUTANEOUS at 15:13

## 2023-11-24 RX ADMIN — SODIUM CHLORIDE, PRESERVATIVE FREE 10 ML: 5 INJECTION INTRAVENOUS at 08:34

## 2023-11-24 RX ADMIN — HEPARIN SODIUM 5000 UNITS: 5000 INJECTION INTRAVENOUS; SUBCUTANEOUS at 06:40

## 2023-11-24 RX ADMIN — SODIUM BICARBONATE 650 MG TABLET 1300 MG: at 20:10

## 2023-11-24 RX ADMIN — INSULIN GLARGINE 10 UNITS: 100 INJECTION, SOLUTION SUBCUTANEOUS at 21:20

## 2023-11-24 ASSESSMENT — PAIN SCALES - GENERAL: PAINLEVEL_OUTOF10: 0

## 2023-11-24 NOTE — PROGRESS NOTES
PHYSICAL THERAPY DAILY NOTE  Time In:    Time Out:  1125  Total Treatment Time:  50 Minutes  Pt. Seen for: AM, Balance Training, Gait Training, Patient Education, Transfer Training, Wheelchair mobility, and Other     Subjective: Patient reporting he knows he can go up/down his steps at home he just needs to practice. Reports he feel one time going up down a single step with his walker after he initially injured his knee. Patient reporting his left foot and ankle swelling is better. Reports the NELY hose on his LLE feels good         Objective:  Precautions: Falls and WB: TDWB LLE with left knee brace locked in extension. GROSS ASSESSMENT Daily Assessment   Dependent assist to put on LLE knee high NELY hose Independent with donning and doffing BKA prosthesis       COGNITION Daily Assessment    Alert, able to follow commands,cooperating,participating, motivated,          BED/MAT MOBILITY Daily Assessment   Increased time and effort to complete using Ue's to assist LLE   Rolling Right: Modified Independent  Rolling Left: Modified Independent  Supine to Sit: Modified Independent  Sit to Supine: Modified Independent       TRANSFERS Daily Assessment   Increased time and effort to complete with cues for body mechanics and TDWB LLE   Sit to Stand: SBA  Stand to Sit: SBA  Transfer Type: Stand Pivot with RW and SBA  Transfer Assistance: SBA  Car Transfers: NT         GAIT Daily Assessment   Gait training with slow start/stop step to gait pattern leading with LLE and stepping to with RLE, TDWB LLE with knee brace locked in extension Amount of Assistance: Supervision/Standby Assist and CGA  Distance (ft): 100ft x 1 with RW and SBA,   Assistive Device: RW  Surface: Level Surface       STEPS/STAIRS Daily Assessment   Single step at a time leading up with RLE and down with LLE, Increased time and effort to complete with cues for TDWB LLE.  Steps Ambulated:  10  Level of Assistance:  Min A  Railing:Single Railing on R going up

## 2023-11-24 NOTE — PROGRESS NOTES
OT Daily Note  Time In 1118   Time Out 1210     Subjective: \"I can do that\"  Pain: 0/10  Education: N/A  Interdisciplinary Communication: RN, PT    Mobility   Score Comments   Rolling      Supine to Sit      Sit to Supine      Sit to Stand      Transfer Assist        Strengthening   Pt completed UB therapeutic exercise to challenge BUE AROM/STR/coord and activity tolerance in preparation for safe return to max (I) with I/ADLs. Pt exercises graded up. Pt tolerated increase and session well with no c/o pain. Pt required increased rest break duration and frequency throughout. Shoulder press; 2lb dowel bar; 3 x 20  Shoulder raise; 2lb dowel bar; 3 x 20  Bicep curls; 10lb dowel bar; 3 x 20  Chest press; 4lb dowel bar; 3 x 20  Forward rows; 4lb dowel bar; 3 x 20     Self-Care   Pt completed toileting Sup-V with RW and grab bars seated and standing. Pt completed hand hygiene standing at sink with RW. Assessment: Pt tolerated session well. Pt making strong progress towards all goals since [facility]. Plan: Continue OT POC.      Franco Mccoy OT   11/24/2023

## 2023-11-24 NOTE — PROGRESS NOTES
OT DAILY NOTE    Time In:    0916     Time Out: 0956       Functional Mobility   Score Comments   Rolling N/A    Sit to Supine N/A    Supine to Sit N/A    Sit to Stand N/A    Transfer Assist N/A      Activities of Daily Living   Score Comments   Eating Independent     Oral Hygiene Independent     Bathing Supervision or touching assistance Sup-V seated on TTB in shower   Upper Body  Dressing Setup or clean-up assistance S/U doff/don shirt seated   Lower Body Dressing Supervision or touching assistance SBA seated and standing with RW   Donning/[place] [employer] Independent Doff/don seated   Toilet Transfer Supervision or touching assistance Sup-V with [place] or touching assistance Sup-V with RW       Summary of Session: S: \"I just need to bag my leg. \" Agreeable to therapy. Focus of session was on morning ADL routine. Pt completing toileting on entry in bathroom. Patient was able to ambulate household distances for ADLs in room and bathroom with RW. Pain 0/10. Collaborated with RN, PT and confirmed patient is on track to reach goals as documented in the care plan. Continue OT POC with focus on ADL/IADL skills, functional transfers, functional mobility, coordination, strength, static and dynamic balance, and activity tolerance to maximize safety and independence with ADLs and functional transfers. Patient ended session in seated in recliner with call remote and phone within reach.        Zofia Thapa OT  11/24/2023

## 2023-11-24 NOTE — PROGRESS NOTES
Inpatient [address]  [facility]  Tel: [phone]     Physical Medicine and Rehabilitation Progress Note      Vincent Estrada  Admit Date: 11/21/2023  Admit Diagnosis: Quadriceps tendon rupture, left, initial encounter [S76.112A]    Subjective   Patient seen face-to-face and evaluated today. NAEO and he reports sleeping well. He is eating and drinking fine, and continent of bowel and bladder with continued loose stools. I have reminded him several times he has Loperamide prn available. His pain is controlled with some moderate BT episodes and I have reminded him to peritherapy dose meds prn. He's doing very well in therapies. His Bss have been good and he hasn't needed SSI, so BS sticks changed to daily only.     Objective:     Current Facility-Administered Medications   Medication Dose Route Frequency    alogliptin (NESINA) tablet 6.25 mg  6.25 mg Oral Daily    loperamide (IMODIUM) capsule 2 mg  2 mg Oral 4x Daily PRN    losartan (COZAAR) tablet 50 mg  50 mg Oral Daily    sodium bicarbonate tablet 1,300 mg  1,300 mg Oral BID    tamsulosin (FLOMAX) capsule 0.4 mg  0.4 mg Oral Daily    glucose chewable tablet 16 g  4 tablet Oral PRN    dextrose bolus 10% 125 mL  125 mL IntraVENous PRN    Or    dextrose bolus 10% 250 mL  250 mL IntraVENous PRN    glucagon (rDNA) injection 1 mg  1 mg SubCUTAneous PRN    dextrose 10 % infusion   IntraVENous Continuous PRN    sodium chloride flush 0.9 % injection 5-40 mL  5-40 mL IntraVENous 2 times per day    sodium chloride flush 0.9 % injection 5-40 mL  5-40 mL IntraVENous PRN    0.9 % sodium chloride infusion   IntraVENous PRN    ondansetron (ZOFRAN-ODT) disintegrating tablet 4 mg  4 mg Oral Q8H PRN    Or    ondansetron (ZOFRAN) injection 4 mg  4 mg IntraVENous Q6H PRN    diphenhydrAMINE (BENADRYL) capsule 25 mg  25 mg Oral Q6H PRN    Or    diphenhydrAMINE (BENADRYL) injection 25 mg  25 mg IntraVENous Q6H PRN

## 2023-11-25 LAB
GLUCOSE BLD STRIP.AUTO-MCNC: 128 MG/DL (ref 65–100)
GLUCOSE BLD STRIP.AUTO-MCNC: 68 MG/DL (ref 65–100)
GLUCOSE BLD STRIP.AUTO-MCNC: 69 MG/DL (ref 65–100)
SERVICE CMNT-IMP: ABNORMAL
SERVICE CMNT-IMP: NORMAL
SERVICE CMNT-IMP: NORMAL

## 2023-11-25 PROCEDURE — 6360000002 HC RX W HCPCS: Performed by: PHYSICAL MEDICINE & REHABILITATION

## 2023-11-25 PROCEDURE — 82962 GLUCOSE BLOOD TEST: CPT

## 2023-11-25 PROCEDURE — 1180000000 HC REHAB R&B

## 2023-11-25 PROCEDURE — 97110 THERAPEUTIC EXERCISES: CPT

## 2023-11-25 PROCEDURE — 97150 GROUP THERAPEUTIC PROCEDURES: CPT

## 2023-11-25 PROCEDURE — 97535 SELF CARE MNGMENT TRAINING: CPT

## 2023-11-25 PROCEDURE — 97116 GAIT TRAINING THERAPY: CPT

## 2023-11-25 PROCEDURE — 99231 SBSQ HOSP IP/OBS SF/LOW 25: CPT | Performed by: PHYSICAL MEDICINE & REHABILITATION

## 2023-11-25 PROCEDURE — 6370000000 HC RX 637 (ALT 250 FOR IP): Performed by: PHYSICAL MEDICINE & REHABILITATION

## 2023-11-25 RX ORDER — SODIUM POLYSTYRENE SULFONATE 15 G/60ML
30 SUSPENSION ORAL; RECTAL ONCE
Status: COMPLETED | OUTPATIENT
Start: 2023-11-25 | End: 2023-11-25

## 2023-11-25 RX ADMIN — SODIUM BICARBONATE 650 MG TABLET 1300 MG: at 08:11

## 2023-11-25 RX ADMIN — INSULIN GLARGINE 10 UNITS: 100 INJECTION, SOLUTION SUBCUTANEOUS at 20:57

## 2023-11-25 RX ADMIN — HEPARIN SODIUM 5000 UNITS: 5000 INJECTION INTRAVENOUS; SUBCUTANEOUS at 21:30

## 2023-11-25 RX ADMIN — LOSARTAN POTASSIUM 50 MG: 50 TABLET, FILM COATED ORAL at 08:11

## 2023-11-25 RX ADMIN — SODIUM BICARBONATE 650 MG TABLET 1300 MG: at 20:57

## 2023-11-25 RX ADMIN — TAMSULOSIN HYDROCHLORIDE 0.4 MG: 0.4 CAPSULE ORAL at 08:11

## 2023-11-25 RX ADMIN — HEPARIN SODIUM 5000 UNITS: 5000 INJECTION INTRAVENOUS; SUBCUTANEOUS at 06:03

## 2023-11-25 RX ADMIN — HEPARIN SODIUM 5000 UNITS: 5000 INJECTION INTRAVENOUS; SUBCUTANEOUS at 14:05

## 2023-11-25 RX ADMIN — SODIUM POLYSTYRENE SULFONATE 30 G: 15 SUSPENSION ORAL; RECTAL at 14:05

## 2023-11-25 RX ADMIN — ALOGLIPTIN 6.25 MG: 6.25 TABLET, FILM COATED ORAL at 08:11

## 2023-11-25 NOTE — PROGRESS NOTES
OT DAILY NOTE    Time In:    0829     Time Out: 0946       Functional Mobility   Score Comments   Rolling N/A    Sit to Supine N/A    Supine to Sit N/A Up in chair   Sit to Stand 4: Supervision or touching A Sup-V   Transfer Assist 4: Supervision or touching A Sup-V with RW     Activities of Daily Living   Score Comments   Eating Independent     Oral Hygiene Independent     Bathing Supervision or touching assistance Sup-V - S/U seated on TTB (assist with 2x bagging LLE)   Upper Body  Dressing Setup or clean-up assistance S/U doff/don shirt seated   Lower Body Dressing Supervision or touching assistance SBA doff/don brief and pants seated and standing with RW   Donning/[place] [employer] Independent doff/don seated   Toilet Transfer Supervision or touching assistance Sup-V with [place] or touching assistance Sup-V with RW       Summary of Session: S: \"Can't let your opponent know you weak spot. \" Agreeable to therapy. Focus of session was on morning ADL routine and strength/activity tolerance training. Patient was able to ambulate household distances for ADLs in room and bathroom with RW. Strengthening   Pt completed UB therapeutic exercise to challenge BUE AROM/STR/coord and activity tolerance in preparation for safe return to max (I) with I/ADLs. Pt exercises graded up. Pt tolerated increase and session well pt reported increased discomfort in L shoulder with 3lb shoulder raise, weight reduced to prior and no further c/o discomfort. All other increases tolerated well. Pt required increased rest break duration and frequency throughout. Shoulder raise; 3lb dowel bar; 1 x 20, 2lb dowel bar; 2 x 20  Shoulder press; 2lb dowel bar; 3 x 20  Bicep curls; 10lb dowel bar; 3 x 25  Chest press; 4lb dowel bar; 3 x 25  Forward rows; 4lb dowel bar; 3 x 25     Pain 0/10. Collaborated with RN, PT and confirmed patient is on track to reach goals as documented in the care plan.    Continue OT POC with

## 2023-11-25 NOTE — PROGRESS NOTES
stay.     The patient will also require 24-hour skilled rehabilitation nursing for bowel and bladder management, skin care for decubitus ulcer prevention, pain management and ongoing medication administration. Plan / Recommendations / Medical Decision Making:      Daily physician / PA medical management:     Quadriceps tendon rupture, left, initial encounter [C43.906H] - No reported post op complications. Start interdisciplinary approach to rehabilitation including PT, OT, nursing and physiatry and disease specific education. -TTWB LLE in a knee immobilizer until advanced by Orthopedics.  -Pain management with Tylenol prn, and Percocet 7.5 -15mg q4h prn. Pt reports mild to moderate post op pain. Hypertension - BP managed medically on Losartan 50mg daily. Adjust prn. Diabetes mellitus - HgbA1c 5.7% on 11/3/23; impaired / poor glycemic control. Will require ACHS -> daily (11/24) glucose monitoring and medication adjustment to optimize control.   -Continue home Alogliptin 6.25mg daily, Glargine insulin 10U qhs, SSI prn, and a regular diet with thins. BPH - Continue Flomax 0.4mg daily. Severe Anemia - Most recent H/H 7.0/22.9 on 11/21/23. Follow with surveillance labs. CKD4 Renee Marquez with surveillance labs  -K+ 5.3, Kayexalate 30GM given once (11/25)     Pneumonia prophylaxis - incentive spirometer every hour while awake. DVT risk / DVT prophylaxis - mobilize as tolerated. SCDs when in bed; NELY hose when out of bed. Heparin subcutaneously q8h      Electrolyte Impairment - Had Metabolic Acidosis with HCO3- as low as 12. Continue Sodium Bicarbonate 1300mg BID. GI prophylaxis - At times may need antacids, Maalox PRN. General skin care / wound prevention - monitor left knee incision and general skin status daily. At risk for failure due to impaired mobility. Daily checks, keep wound clean and dry, reinforce dressing PRN, and ice to area for comfort. Offload <q2Hrs.      Bladder program / urinary retention / neurogenic bladder - schedule voids q6-8h prn. Check post-void residual as needed; in-and-out catheter if post-void residual is more than 400ml. Pt reports baseline bladder function without incontinence, OAB symptoms, or urgency on IPR admission. Bowel program - at risk for constipation as a side effect of opioids, other medications, impaired mobility, etc. MiraLAX daily for regularity, Senokot-S for stool softener + laxative. PRN MOM, bisacodyl suppository or tablets for constipation. Pt reports recent diarrhea/loose stools with some incontinence on IPR admission. He has Loperamide prn available. Disposition: The patient's prognosis for significant practical improvement within a reasonable period of time appears good and the estimated length of stay is 7 days; patient is expected to return home with brother's family supervision and continued rehabilitation with home health therapy. Follow Up:  Dr. Paradise Luna MD (PCP) 1-2 weeks post IPR  Dr. Faye Fisher MD (Orthopedic Surgery) per his f/u scheduling      Time spent was 27 minutes with over 1/2 in direct patient care / examination, consultation with therapists / nursing and coordination of care.     Signed By: Sona Rankin MD     November 25, 2023

## 2023-11-25 NOTE — PROGRESS NOTES
If you need to see a   -  just ask the nurse to call us. We look forward to serving your family!!         Sandra Still

## 2023-11-25 NOTE — PROGRESS NOTES
PHYSICAL THERAPY DAILY NOTE  Time In: 950  Time Out:  1105  Total Treatment Time:  75 Minutes  Pt. Seen for: AM, Balance Training, Gait Training, Patient Education, Transfer Training, Wheelchair mobility, and Other     Subjective: Patient reporting he has 2 steps with no railing to get into his home. Reports he has crutches and a RW at home. Objective:  Precautions: Falls and WB: TDWB LLE with left knee brace locked in extension.      GROSS ASSESSMENT Daily Assessment   Dependent assist to put on LLE knee high NELY hose Independent with donning and doffing BKA prosthesis       COGNITION Daily Assessment    Alert, able to follow commands,cooperating,participating, motivated,          BED/MAT MOBILITY Daily Assessment   Increased time and effort to complete using Ue's to assist LLE   Rolling Right: Modified Independent  Rolling Left: Modified Independent  Supine to Sit: Modified Independent  Sit to Supine: Modified Independent       TRANSFERS Daily Assessment   Increased time and effort to complete with cues for body mechanics and TDWB LLE   Sit to Stand: Supervision  Stand to Sit: Supervision  Transfer Type: Stand Pivot with RW and Supervision  Transfer Assistance: Supervision  Car Transfers: NT         GAIT Daily Assessment   Gait training with slow start/stop step to gait pattern leading with LLE and stepping to with RLE, TDWB LLE with knee brace locked in extension Amount of Assistance: Supervision  Distance (ft): 100ft x 1 with RW and Supervision  Assistive Device: RW  Surface: Level Surface   Gait training x 20 ft x 1 with RW and precautions as noted above in figure 8 pattern around bolsters with SBA to supervision     Gait training x 20 ft x 1 with RW and precautions as noted above ambulating through two 24 inch wide simulated doorways at 980 degree angles to each other with SBS to supervision    STEPS/STAIRS Daily Assessment   Single step at a time leading up with RLE and down with LLE, Increased time

## 2023-11-26 VITALS
BODY MASS INDEX: 20.46 KG/M2 | SYSTOLIC BLOOD PRESSURE: 112 MMHG | OXYGEN SATURATION: 95 % | DIASTOLIC BLOOD PRESSURE: 63 MMHG | WEIGHT: 168 LBS | HEART RATE: 99 BPM | RESPIRATION RATE: 18 BRPM | HEIGHT: 76 IN | TEMPERATURE: 98.6 F

## 2023-11-26 LAB
GLUCOSE BLD STRIP.AUTO-MCNC: 111 MG/DL (ref 65–100)
GLUCOSE BLD STRIP.AUTO-MCNC: 97 MG/DL (ref 65–100)
SERVICE CMNT-IMP: ABNORMAL
SERVICE CMNT-IMP: NORMAL

## 2023-11-26 PROCEDURE — 6360000002 HC RX W HCPCS: Performed by: PHYSICAL MEDICINE & REHABILITATION

## 2023-11-26 PROCEDURE — 6370000000 HC RX 637 (ALT 250 FOR IP): Performed by: PHYSICAL MEDICINE & REHABILITATION

## 2023-11-26 PROCEDURE — 82962 GLUCOSE BLOOD TEST: CPT

## 2023-11-26 PROCEDURE — 1180000000 HC REHAB R&B

## 2023-11-26 RX ADMIN — LOSARTAN POTASSIUM 50 MG: 50 TABLET, FILM COATED ORAL at 08:18

## 2023-11-26 RX ADMIN — ALOGLIPTIN 6.25 MG: 6.25 TABLET, FILM COATED ORAL at 08:18

## 2023-11-26 RX ADMIN — HEPARIN SODIUM 5000 UNITS: 5000 INJECTION INTRAVENOUS; SUBCUTANEOUS at 06:00

## 2023-11-26 RX ADMIN — TAMSULOSIN HYDROCHLORIDE 0.4 MG: 0.4 CAPSULE ORAL at 08:18

## 2023-11-26 RX ADMIN — SODIUM BICARBONATE 650 MG TABLET 1300 MG: at 20:36

## 2023-11-26 RX ADMIN — HEPARIN SODIUM 5000 UNITS: 5000 INJECTION INTRAVENOUS; SUBCUTANEOUS at 20:36

## 2023-11-26 RX ADMIN — HEPARIN SODIUM 5000 UNITS: 5000 INJECTION INTRAVENOUS; SUBCUTANEOUS at 14:58

## 2023-11-26 RX ADMIN — INSULIN GLARGINE 10 UNITS: 100 INJECTION, SOLUTION SUBCUTANEOUS at 20:36

## 2023-11-26 RX ADMIN — SODIUM BICARBONATE 650 MG TABLET 1300 MG: at 08:18

## 2023-11-27 PROBLEM — Z51.89 ENCOUNTER FOR REHABILITATION: Status: ACTIVE | Noted: 2023-11-21

## 2023-11-27 PROBLEM — N18.30 CKD (CHRONIC KIDNEY DISEASE) STAGE 3, GFR 30-59 ML/MIN (HCC): Status: ACTIVE | Noted: 2019-08-25

## 2023-11-27 PROBLEM — R26.89 NOT BEARING WEIGHT ON LOWER EXTREMITY: Status: ACTIVE | Noted: 2023-11-16

## 2023-11-27 PROBLEM — Z78.9 DECREASED INDEPENDENCE WITH ACTIVITIES OF DAILY LIVING: Status: ACTIVE | Noted: 2023-11-04

## 2023-11-27 PROBLEM — Z74.09 IMPAIRED FUNCTIONAL MOBILITY, BALANCE, GAIT, AND ENDURANCE: Status: ACTIVE | Noted: 2023-11-04

## 2023-11-27 PROBLEM — E11.21 TYPE 2 DIABETES WITH NEPHROPATHY (HCC): Status: ACTIVE | Noted: 2018-03-23

## 2023-11-27 LAB
GLUCOSE BLD STRIP.AUTO-MCNC: 165 MG/DL (ref 65–100)
GLUCOSE BLD STRIP.AUTO-MCNC: 94 MG/DL (ref 65–100)
SERVICE CMNT-IMP: ABNORMAL
SERVICE CMNT-IMP: NORMAL

## 2023-11-27 PROCEDURE — 99231 SBSQ HOSP IP/OBS SF/LOW 25: CPT | Performed by: PHYSICAL MEDICINE & REHABILITATION

## 2023-11-27 PROCEDURE — 97116 GAIT TRAINING THERAPY: CPT

## 2023-11-27 PROCEDURE — 97542 WHEELCHAIR MNGMENT TRAINING: CPT

## 2023-11-27 PROCEDURE — 97530 THERAPEUTIC ACTIVITIES: CPT

## 2023-11-27 PROCEDURE — 82962 GLUCOSE BLOOD TEST: CPT

## 2023-11-27 PROCEDURE — 1180000000 HC REHAB R&B

## 2023-11-27 PROCEDURE — 97535 SELF CARE MNGMENT TRAINING: CPT

## 2023-11-27 PROCEDURE — 6360000002 HC RX W HCPCS: Performed by: PHYSICAL MEDICINE & REHABILITATION

## 2023-11-27 PROCEDURE — 6370000000 HC RX 637 (ALT 250 FOR IP): Performed by: PHYSICAL MEDICINE & REHABILITATION

## 2023-11-27 PROCEDURE — 97110 THERAPEUTIC EXERCISES: CPT

## 2023-11-27 RX ORDER — INSULIN GLARGINE 100 [IU]/ML
10 INJECTION, SOLUTION SUBCUTANEOUS NIGHTLY
Qty: 3 ML | Refills: 0
Start: 2023-11-27

## 2023-11-27 RX ADMIN — HEPARIN SODIUM 5000 UNITS: 5000 INJECTION INTRAVENOUS; SUBCUTANEOUS at 20:29

## 2023-11-27 RX ADMIN — ALOGLIPTIN 6.25 MG: 6.25 TABLET, FILM COATED ORAL at 09:31

## 2023-11-27 RX ADMIN — SODIUM BICARBONATE 650 MG TABLET 1300 MG: at 07:35

## 2023-11-27 RX ADMIN — HEPARIN SODIUM 5000 UNITS: 5000 INJECTION INTRAVENOUS; SUBCUTANEOUS at 05:56

## 2023-11-27 RX ADMIN — TAMSULOSIN HYDROCHLORIDE 0.4 MG: 0.4 CAPSULE ORAL at 07:35

## 2023-11-27 RX ADMIN — SODIUM BICARBONATE 650 MG TABLET 1300 MG: at 20:28

## 2023-11-27 RX ADMIN — INSULIN GLARGINE 10 UNITS: 100 INJECTION, SOLUTION SUBCUTANEOUS at 20:28

## 2023-11-27 RX ADMIN — LOSARTAN POTASSIUM 50 MG: 50 TABLET, FILM COATED ORAL at 07:35

## 2023-11-27 RX ADMIN — HEPARIN SODIUM 5000 UNITS: 5000 INJECTION INTRAVENOUS; SUBCUTANEOUS at 13:51

## 2023-11-27 ASSESSMENT — PAIN SCALES - GENERAL
PAINLEVEL_OUTOF10: 0
PAINLEVEL_OUTOF10: 0

## 2023-11-27 NOTE — PROGRESS NOTES
OT DAILY NOTE    Time In:    1305     Time Out: 0745       Functional Mobility   Score Comments   Sit to Stand 4: Supervision or touching A S   Transfer Assist 4: Supervision or touching A SBA with RWELMO TDWB     Activities of Daily Living   Score Comments   Eating Independent     Oral Hygiene Independent     Bathing Setup or clean-up assistance ttb and grab bar   Upper Body  Dressing Independent    Lower Body Dressing Setup or clean-up assistance    Donning/[place] Footwear Independent doff/don seated       Summary of Session: S: \"My brother ordered me the shower bench and I practiced getting in and out of the tub shower with Jessa Ghotra. \" Agreeable to therapy. Focus of session was on morning ADL routine. Patient was able to ambulate ~10 feet using a RW with SBA. Pain not expressed. Collaborated with PT and confirmed patient is on track to reach goals as documented in the care plan. Continue OT POC with focus on ADL/IADL skills, functional transfers, functional mobility, coordination, strength, static and dynamic balance, and activity tolerance to maximize safety and independence with ADLs and functional transfers. Patient ended session in recliner with call remote and phone within reach.        Albert Shoemaker, OT  11/27/2023

## 2023-11-27 NOTE — PROGRESS NOTES
OT Daily Note  Time In 1258   Time Out 1355     Subjective: \"I'm good, it feels good\"  Pain: 0/10  Education: N/A  Interdisciplinary Communication: RN, PT    Mobility   Score Comments   Rolling      Supine to Sit      Sit to Supine      Sit to Stand Supervision or touching assistance    Transfer Assist Supervision or touching assistance      Strengthening   Pt completed UB therapeutic exercise to challenge BUE AROM/STR/coord and activity tolerance in preparation for safe return to max (I) with I/ADLs. Pt exercises graded up. Pt tolerated increase and session well pt reported increased discomfort in L shoulder with 3lb shoulder raise, weight reduced to prior and no further c/o discomfort. All other increases tolerated well. Pt required increased rest break duration and frequency throughout. Shoulder raise; 2lb dowel bar; 3 x 25  Shoulder press; 2lb dowel bar; 3 x 25  Bicep curls; 10lb dowel bar; 4 x 25  Chest press; 4lb dowel bar; 4 x 25  Forward rows; 4lb dowel bar; 4 x 25     Assessment: Pt tolerated session well. Pt making strong progress since [facility] with balance, BUE AROM/STR and general activity tolerance. Plan: Continue OT POC.      Calvin Lovelace OT   11/27/2023

## 2023-11-27 NOTE — PROGRESS NOTES
OT Daily Note  Time In:    0930     Time Out: 1005        Subjective: \"I just need to keep working hard to be able to go home. \"  Pain: not expressed  Education: BUE strengthening exercises   Interdisciplinary Communication: with PT regarding discharge plans    Mobility   Score Comments   Sit to Stand  SBA    Transfer Assist  SBA Ambulating TDWB LLE with RW     Strengthening   Pt completed 20 minutes on the ergometer frontwards and backwards with medium heavy resistance to increase UB strength and activity tolerance for integration into functional transfers. Pt completed x10 reps x5 sets x60 lbs on Fliptu working on BUE strength for sit > stand transfers. Assessment: Pt is progressing well in functional transfers, overall safety, and BUE strength to assist in functional mobility. Plan: Continue OT POC.      Albert Rand OT   11/27/2023

## 2023-11-27 NOTE — PROGRESS NOTES
Inpatient [address]  [facility]  Tel: [phone]         11/27/2023    Lonie Dakins [place]  Admit Date: 11/21/2023  Admit Diagnosis:   Quadriceps tendon rupture, left, initial encounter [S76.112A]  Not weight-bearing on left lower extremity [R26.89]  History of right BKA [Z89.511]  Impaired functional mobility, balance, gait, and endurance [Z74.09]  Decreased independence with activities of daily living [Z78.9]    Statement of Omar Jane has been evaluated face-to-face on a daily basis by the Physiatry MD / PA for the above-noted diagnoses since admission to our rehabilitation program. It is medically necessary for this patient to have a manual wheelchair for home and community use at discharge from inpatient rehab. He recently sustained a left quadriceps tendon rupture and is s/p surgical repair 11/16/2023. He is limited to toe-touch weightbearing (essentially non-weightbearing) on left lower extremity. His mobility is complicated by the fact he has a prior contralateral (right) BKA. As such, he has impaired ability to perform any ADLs and has inability to ambulate household distances even with the use of a rolling walker. A manual wheelchair will provide the needed support for trunk control and proper positioning. It will also facilitate his mobility, enhance his safety and decrease the caregivers' burden of care.       Lauren Durbin PA-C  Physician Assistant with [facility]

## 2023-11-27 NOTE — PROGRESS NOTES
PHYSICAL THERAPY DAILY NOTE  Time In:  3609  Time Out:  1621  Total Treatment Time:  50 Minutes  Pt. Seen for: PM, Balance Training, Family Training, Gait Training, Patient Education, Transfer Training, Wheelchair mobility, and Other     Subjective: Patient reporting his brother is here for family training. Objective:  Precautions: Falls and WB: TDWB LLE with left knee brace locked in extension    GROSS ASSESSMENT Daily Assessment    Independent with donning and doffing RLE prosthesis       COGNITION Daily Assessment    Alert, able to follow commands,cooperating,participating, motivated,          BED/MAT MOBILITY Daily Assessment   Increased time and effort to complete using UE 's to assist LLE on and off bed. Rolling Right: Modified Independent  Rolling Left: Modified Independent  Supine to Sit: Modified Independent  Sit to Supine: Modified Independent       TRANSFERS Daily Assessment   Increased time and effort to complete   Patient's brother observed patient performing SPT with RW with precautions as noted. Sit to Stand: Modified Independent  Stand to Sit: Modified Independent  Transfer Type: Stand Pivot with RW, TDWB LLE with LLE knee brace locked in extension and RLE BKA prosthesis  Transfer Assistance: Modified Independent  Car Transfers: NT, Patient unable to fit in rehab car. Will assess car transfers at D/C assisting patient to get into family member's car         GAIT Daily Assessment   Slow start/stop step to gait pattern leading with LLE and stepping to with RLE, TDWB LLE to NWB LLE. Patient's brother observed patient ambulating with RW as noted. Made recommendations for modifying home environment to decrease risk for falling.  Amount of Assistance: Modified Independent  Distance (ft): 100  Assistive Device: RW, Orthotic Device, and LLE knee, brace locked in extension  RLE BKA prosthesis  Surface: Level Surface       STEPS/STAIRS Daily Assessment   Single step at a time leading up with

## 2023-11-27 NOTE — PROGRESS NOTES
PHYSICAL THERAPY DAILY NOTE  Time In: 830  Time Out:  915  Total Treatment Time:  45 Minutes  Pt. Seen for: AM, Balance Training, Gait Training, Patient Education, Transfer Training, Wheelchair mobility, and Other     Subjective: Patient reporting he will text his brother about coming in for family training this week. Reports he has a follow up appt with his surgeon 11/29/2023 at 9:20 AM         Objective:  Precautions: Falls and WB: TDWB LLE with left knee brace locked in extension. GROSS ASSESSMENT Daily Assessment   Dependent assist to put on LLE knee high NELY hose Independent with donning and doffing BKA prosthesis       COGNITION Daily Assessment    Alert, able to follow commands,cooperating,participating, motivated,          BED/MAT MOBILITY Daily Assessment   Increased time and effort to complete using Ue's to assist LLE   Rolling Right: Modified Independent  Rolling Left: Modified Independent  Supine to Sit: Modified Independent  Sit to Supine: Modified Independent       TRANSFERS Daily Assessment   Increased time and effort to complete with cues for body mechanics and TDWB LLE   Sit to Stand: Modified independent  Stand to Sit: Modified independent  Transfer Type: Stand Pivot with RW   Transfer Assistance: Modified independent. Car Transfers: NT         GAIT Daily Assessment   Gait training with slow start/stop step to gait pattern leading with LLE and stepping to with RLE, TDWB LLE with knee brace locked in extension Amount of Assistance: Supervision  Distance (ft): 150ft x 1 with RW and Supervision  Assistive Device: RW  Surface: Level Surface   Gait training x 20 ft x 1 with RW and precautions as noted above in figure 8 pattern around bolsters with SBA to supervision         STEPS/STAIRS Daily Assessment   Single step at a time leading up with RLE and down with LLE, Increased time and effort to complete with cues for TDWB LLE.  Steps Ambulated:  10  Level of Assistance:  Venancio Hidalgo

## 2023-11-27 NOTE — PROGRESS NOTES
complications. Start interdisciplinary approach to rehabilitation including PT, OT, nursing and physiatry and disease specific education. -TTWB LLE in a knee immobilizer until advanced by Orthopedics.  -Pain management with Tylenol prn, and Percocet 7.5 -15mg q4h prn. Pt reports mild to moderate post op pain. Hypertension - BP managed medically on Losartan 50mg daily. Adjust prn. Diabetes mellitus - HgbA1c 5.7% on 11/3/23; impaired / poor glycemic control. Will require ACHS -> daily (11/24) glucose monitoring and medication adjustment to optimize control.   -Continue home Alogliptin 6.25mg daily, Glargine insulin 10U qhs, SSI prn, and a regular diet with thins. BPH - Continue Flomax 0.4mg daily. Severe Anemia - Most recent H/H 7.0/22.9 on 11/21/23. Follow with surveillance labs. -H/H 7.9/25.7 on 11/24    CKD4 - [place]ow with surveillance labs  -K+ 5.3, Kayexalate 30GM given once (11/25)     Pneumonia prophylaxis - incentive spirometer every hour while awake. DVT risk / DVT prophylaxis - mobilize as tolerated. SCDs when in bed; NELY hose when out of bed. Heparin subcutaneously q8h      Electrolyte Impairment - Had Metabolic Acidosis with HCO3- as low as 12. Continue Sodium Bicarbonate 1300mg BID. GI prophylaxis - At times may need antacids, Maalox PRN. General skin care / wound prevention - monitor left knee incision and general skin status daily. At risk for failure due to impaired mobility. Daily checks, keep wound clean and dry, reinforce dressing PRN, and ice to area for comfort. Offload <q2Hrs. Bladder program / urinary retention / neurogenic bladder - schedule voids q6-8h prn. Check post-void residual as needed; in-and-out catheter if post-void residual is more than 400ml. Pt reports baseline bladder function without incontinence, OAB symptoms, or urgency on IPR admission.      Bowel program - at risk for constipation as a side effect of opioids, other medications, impaired

## 2023-11-27 NOTE — PLAN OF CARE
Problem: Safety - Adult  Goal: Free from fall injury  Outcome: Progressing     Problem: ABCDS Injury Assessment  Goal: Absence of physical injury  Outcome: Progressing External Doon/External FHR/Internal FHR

## 2023-11-28 ENCOUNTER — TELEPHONE (OUTPATIENT)
Dept: INTERNAL MEDICINE CLINIC | Facility: CLINIC | Age: 63
End: 2023-11-28

## 2023-11-28 ENCOUNTER — HOME CARE VISIT (OUTPATIENT)
Dept: HOME HEALTH SERVICES | Facility: HOME HEALTH | Age: 63
End: 2023-11-28
Payer: MEDICAID

## 2023-11-28 ENCOUNTER — HOME HEALTH ADMISSION (OUTPATIENT)
Dept: HOME HEALTH SERVICES | Facility: HOME HEALTH | Age: 63
End: 2023-11-28
Payer: MEDICAID

## 2023-11-28 VITALS
OXYGEN SATURATION: 98 % | WEIGHT: 168 LBS | HEIGHT: 76 IN | HEART RATE: 80 BPM | TEMPERATURE: 98.5 F | DIASTOLIC BLOOD PRESSURE: 73 MMHG | SYSTOLIC BLOOD PRESSURE: 132 MMHG | RESPIRATION RATE: 18 BRPM | BODY MASS INDEX: 20.46 KG/M2

## 2023-11-28 LAB
ANION GAP SERPL CALC-SCNC: 8 MMOL/L (ref 2–11)
BUN SERPL-MCNC: 57 MG/DL (ref 8–23)
CALCIUM SERPL-MCNC: 8.5 MG/DL (ref 8.3–10.4)
CHLORIDE SERPL-SCNC: 118 MMOL/L (ref 101–110)
CO2 SERPL-SCNC: 17 MMOL/L (ref 21–32)
CREAT SERPL-MCNC: 4.1 MG/DL (ref 0.8–1.5)
ERYTHROCYTE [DISTWIDTH] IN BLOOD BY AUTOMATED COUNT: 12.8 % (ref 11.9–14.6)
GLUCOSE BLD STRIP.AUTO-MCNC: 86 MG/DL (ref 65–100)
GLUCOSE SERPL-MCNC: 78 MG/DL (ref 65–100)
HCT VFR BLD AUTO: 24.1 % (ref 41.1–50.3)
HGB BLD-MCNC: 7.4 G/DL (ref 13.6–17.2)
MCH RBC QN AUTO: 29.8 PG (ref 26.1–32.9)
MCHC RBC AUTO-ENTMCNC: 30.7 G/DL (ref 31.4–35)
MCV RBC AUTO: 97.2 FL (ref 82–102)
NRBC # BLD: 0 K/UL (ref 0–0.2)
PLATELET # BLD AUTO: 308 K/UL (ref 150–450)
PMV BLD AUTO: 9.8 FL (ref 9.4–12.3)
POTASSIUM SERPL-SCNC: 5.1 MMOL/L (ref 3.5–5.1)
RBC # BLD AUTO: 2.48 M/UL (ref 4.23–5.6)
SERVICE CMNT-IMP: NORMAL
SODIUM SERPL-SCNC: 143 MMOL/L (ref 133–143)
WBC # BLD AUTO: 6.4 K/UL (ref 4.3–11.1)

## 2023-11-28 PROCEDURE — 99239 HOSP IP/OBS DSCHRG MGMT >30: CPT | Performed by: PHYSICIAN ASSISTANT

## 2023-11-28 PROCEDURE — 97116 GAIT TRAINING THERAPY: CPT

## 2023-11-28 PROCEDURE — 36415 COLL VENOUS BLD VENIPUNCTURE: CPT

## 2023-11-28 PROCEDURE — 97530 THERAPEUTIC ACTIVITIES: CPT

## 2023-11-28 PROCEDURE — 6360000002 HC RX W HCPCS: Performed by: PHYSICAL MEDICINE & REHABILITATION

## 2023-11-28 PROCEDURE — 80048 BASIC METABOLIC PNL TOTAL CA: CPT

## 2023-11-28 PROCEDURE — 82962 GLUCOSE BLOOD TEST: CPT

## 2023-11-28 PROCEDURE — 6370000000 HC RX 637 (ALT 250 FOR IP): Performed by: PHYSICAL MEDICINE & REHABILITATION

## 2023-11-28 PROCEDURE — 85027 COMPLETE CBC AUTOMATED: CPT

## 2023-11-28 PROCEDURE — 97535 SELF CARE MNGMENT TRAINING: CPT

## 2023-11-28 RX ADMIN — TAMSULOSIN HYDROCHLORIDE 0.4 MG: 0.4 CAPSULE ORAL at 07:41

## 2023-11-28 RX ADMIN — SODIUM BICARBONATE 650 MG TABLET 1300 MG: at 07:41

## 2023-11-28 RX ADMIN — LOSARTAN POTASSIUM 50 MG: 50 TABLET, FILM COATED ORAL at 07:42

## 2023-11-28 RX ADMIN — ALOGLIPTIN 6.25 MG: 6.25 TABLET, FILM COATED ORAL at 07:41

## 2023-11-28 RX ADMIN — HEPARIN SODIUM 5000 UNITS: 5000 INJECTION INTRAVENOUS; SUBCUTANEOUS at 06:02

## 2023-11-28 ASSESSMENT — PAIN SCALES - GENERAL: PAINLEVEL_OUTOF10: 0

## 2023-11-28 NOTE — PROGRESS NOTES
OT DISCHARGE NOTE    Time In:    9649     Time Out: 0740        Goals:  GOALS:  LTG 1: Patient will complete UB dressing with independence using AE/DME PRN within 7 days. GOAL MET 11/28/2023  LTG 2: Patient will complete LB dressing with independence using AE/DME PRN within 7 days. GOAL MET 11/28/2023  LTG 3: Patient will don footwear with independence using AE/DME PRN within 7 days. GOAL MET 11/28/2023  LTG 4: Patient will complete bathing with independence using AE/DME PRN within 7 days. GOAL MET 11/28/2023  LTG 5: Patient will complete toileting with independence using AE/DME PRN within 7 days. GOAL MET 11/28/2023    *Initial Assessment is the worst a patient did on that activity in the first 72 hrs of being admitted as gathered by the OT   *Discharge Assessment is an average of the patient's performance over the last 48 hrs   Initial Assessment Discharge Assessment 11/28/2023   Eating Independent:    Independent:      Oral Hygiene Independent:     Independent:      Bathing Supervision or touching assistance:  S, pt's LLE wrapped for shower and kept in knee extension Independent:  ttb and grab bar   Upper Body Dressing Setup or clean-up assistance:  S/U doff/don shirt seated Independent:     Lower Body Dressing Supervision or touching assistance:  SBA in stance with RW Independent:     Donning/[place] Footwear Independent:  I to don RLE prothetic leg Independent: Toilet Transfer Supervision or touching assistance:  Sup-V with RW Independent: Toilet Hygiene Supervision or touching assistance:  Sup-V with RW Independent:     BIMS: 15/15    Precautions at Discharge: Falls and LLE TDWB   Discharge Location: home with family   Safety/Supervision Recommendations/Functional Level: Pt will require supervision with IADLs.    Family Training: NA due to pt achieving independence    Recommended Continuing Therapy: Skilled OT services is not recommended at this time  Residual Deficits: decreased balance  Progress over

## 2023-11-28 NOTE — DISCHARGE SUMMARY
4    6  Independent (with RW RLE prosthesis and LLE knee brace locked in extension TDWB LLE)      Chair To/From Bed 4    Independent (SPT with RW RLE prosthesis and LLE knee brace locked in extension TDWB LLE)      Car Transfer 10    10  Not attempted due to environmental limitations (Patient unable to fit in rehab car due to leg length)          WHEELCHAIR MOBILITY/MANAGEMENT Initial Assessment Discharge Assessment Comments   Able to Propel 48' w/ 2 Turns 6    6  Independent      Able to Propel 150' 6 6  Independent      Wheelchair set up assistance required   Modified Independent     Wheelchair management   Manages R Brake, Manages L Brake, Manages R Footrest, and Manages L Footrest           AMBULATION Initial Assessment Discharge Assessment Comments   Assistive device   RW, Orthotic Device, and RLE BKA prosthesis, LLE knee brace locked in extension Independent with donning/doffing R BKA prosthesis   Walk 10' 4       6  Independent (with RW RLE prosthesis and LLE knee brace locked in extension TDWB LLE)      Walk 50' with 2 Turns 4    6  Independent (with RW RLE prosthesis and LLE knee brace locked in extension TDWB LLE)      Walk 150' 88    6  Independent (with RW RLE prosthesis and LLE knee brace locked in extension TDWB LLE)    Slow start/stop step to gait pattern leading with LLE and stepping to with RLE, TDWB to NWB LLE   Walking 10' on Unlevel Surface 88    4  Supervision or touching assistance (with RW RLE prosthesis and LLE knee brace locked in extension TDWB LLE)            STEPS/STAIRS Initial Assessment Discharge Assessment Comments   1 Curb Step 88    4  Supervision or touching assistance (with RW RLE prosthesis and LLE knee brace locked in extension TDWB LLE. Up 6 inch step backwards leading with RLE and down forwards leading down with LLE.  Increased time and effort to complete)      4 Steps 88    3  Partial/moderate assistance (with crutches, RLE prosthesis and LLE knee brace locked in extension

## 2023-11-28 NOTE — PROGRESS NOTES
Discharge paperwork given to patient. Patient verbalized understanding of education given and all questions/concerns addressed.

## 2023-11-28 NOTE — PROGRESS NOTES
PHYSICAL THERAPY DAILY NOTE  Time In:  1332  Time Out:  1447  Total Treatment Time:  15 Minutes  Pt. Seen for: Family Training, Transfer Training, and Other     Subjective: Patient reporting he is ready to go home.  Reports going home in his brother's SUV         Objective:  Precautions: Falls, WB: TDWB LLE, and LLE knee brace locked in extension    GROSS ASSESSMENT Daily Assessment    NA       COGNITION Daily Assessment    Alert, able to follow commands,cooperating,participating, motivated,          BED/MAT MOBILITY Daily Assessment   NT        TRANSFERS Daily Assessment   Increased time and effort to complete with cues for body mechanics  Instructed patient's family member on how to assist patient with  car transfers     Sit to Stand: Modified Independent  Stand to Sit: Modified Independent  Transfer Type: Stand Pivot with RW, R BKA prosthesis and LLE knee beace locked in extension, TDWB LLE  Transfer Assistance: Modified Independent  Car Transfers: Min A SPT with RW and precautions as noted above         GAIT Daily Assessment   NT        STEPS/STAIRS Daily Assessment   NT        BALANCE Daily Assessment    Static Sitting: Normal:  Pt. able to maintain balance w/o UE support  Dynamic Sitting: Normal - accepts maximal challenge & can shift weight easily fully in all directions  Static Standing: Good:  Pt. able to maintain balance w/o UE support;  exhibits some postural sway with RW R BKA prosthesis and TDWB LLE with LLE knee brace locked in extension  Dynamic Standing: Fair - accepts minimal challenge;  can maintain balance while turning head/trunk with RW R BKA prosthesis and TDWB LLE with LLE knee brace locked in extension       WHEELCHAIR MOBILITY Daily Assessment   NT        LOWER EXTREMITY EXERCISES Daily Assessment    NA     Pain level: 2 out of 10  Pain Location:  left knee  Pain Interventions: Medication per order, rest, positioning,relaxation, TDWB LLE      Vital Signs:  Visit Vitals  /73   Pulse 80

## 2023-11-28 NOTE — TELEPHONE ENCOUNTER
D/C DATE: 11/28/23    D/C FROM: [facility]    D/C TO: Home    PHONE NUMBER TO REACH PATIENT: [phone]    F/U APPT DATE & TIME: 12/15 @ 3:45 PM.

## 2023-11-29 ENCOUNTER — CARE COORDINATION (OUTPATIENT)
Dept: CARE COORDINATION | Facility: CLINIC | Age: 63
End: 2023-11-29

## 2023-11-29 ENCOUNTER — OFFICE VISIT (OUTPATIENT)
Dept: ORTHOPEDIC SURGERY | Age: 63
End: 2023-11-29

## 2023-11-29 DIAGNOSIS — S76.112D RUPTURE OF LEFT QUADRICEPS TENDON, SUBSEQUENT ENCOUNTER: ICD-10-CM

## 2023-11-29 DIAGNOSIS — Z09 SURGERY FOLLOW-UP: ICD-10-CM

## 2023-11-29 DIAGNOSIS — M25.562 LEFT KNEE PAIN, UNSPECIFIED CHRONICITY: Primary | ICD-10-CM

## 2023-11-29 PROCEDURE — 99024 POSTOP FOLLOW-UP VISIT: CPT | Performed by: ORTHOPAEDIC SURGERY

## 2023-11-29 NOTE — PROGRESS NOTES
Name: Gutierrez Dove  YOB: 1960  Gender: male  MRN: [id number]    CC:   Chief Complaint   Patient presents with    Knee Pain     First post op left knee quad tendon repair DOS 11/16/23      HPI: Patient presents for first postop appointment status post left knee quad tendon repair on 11-. Today the patient states overall doing well. No Known Allergies  Past Medical History:   Diagnosis Date    CKD (chronic kidney disease) stage 4, GFR 15-29 ml/min (HCC)     per patient    Current use of insulin ([address]) 9/24/2015    glargline only at night    Depression 9/24/2015    Hemorrhoids 9/24/2015    Hypertension     controlled w/ medication    Renal failure 9/24/2015    Type II diabetes mellitus ([address]) 9/24/2015    avg fasting bs: 100-110; glargine insulin only at night; A1C 11/3/23 = 5.7     Past Surgical History:   Procedure Laterality Date    COLONOSCOPY  2018    FRACTURE SURGERY  1982    broken jaw repair    LEG MUSCLE SURGERY Left 11/16/2023    LEFT KNEE QUADRICEPS TENDON REPAIR -REGIONAL BLOCK performed by Michael Gonzalez MD at [facility]     History reviewed. No pertinent family history.   Social History     Socioeconomic History    Marital status:      Spouse name: Not on file    Number of children: Not on file    Years of education: Not on file    Highest education level: Not on file   Occupational History    Not on file   Tobacco Use    Smoking status: Never    Smokeless tobacco: Never   Vaping Use    Vaping Use: Never used   Substance and Sexual Activity    Alcohol use: Not Currently    Drug use: Not Currently    Sexual activity: Not on file   Other Topics Concern    Not on file   Social History Narrative    Not on file     Social Determinants of Health     Financial Resource Strain: Low Risk  (7/13/2023)    Overall Financial Resource Strain (CARDIA)     Difficulty of Paying Living Expenses: Not very hard   Food Insecurity: Food Insecurity Present

## 2023-11-29 NOTE — TELEPHONE ENCOUNTER
Care Transitions Initial Follow Up Call    Outreach made within 2 business days of discharge: Yes    Patient: Joshua Sinha Patient : 1960   MRN: [id number]  Reason for Admission: There are no discharge diagnoses documented for the most recent discharge. Discharge Date: 23       Spoke with: Patient    Discharge department/facility: [facility] Patient Contact:  Was patient able to fill all prescriptions: Yes  Was patient instructed to bring all medications to the follow-up visit: Yes  Is patient taking all medications as directed in the discharge summary?  Yes  Does patient understand their discharge instructions: Yes  Does patient have questions or concerns that need addressed prior to 7-14 day follow up office visit: no    Scheduled appointment with PCP within 7-14 days    Follow Up  Future Appointments   Date Time Provider [address] [place] [address]   12/15/2023  3:45 PM Pancho Staton MD [facility]   2023 10:00 AM ELENI Bush [facility]   3/14/2024  2:30 PM Bhupendra Contreras MD [facility]       KATE RAYMOND MA

## 2023-11-29 NOTE — CARE COORDINATION
Care Transitions Initial Follow Up Call    Call within 2 business days of discharge: Yes    Patient Current Location:  Home: [address] [place] [address]    Care Transition Nurse contacted the patient by telephone to perform post hospital discharge assessment. Verified name and  with patient as identifiers. Provided introduction to self, and explanation of the Care Transition Nurse role. Patient: Roxy Patient : 1960   MRN: [id number]  Reason for Admission: ruptured quadriceps tendon  Discharge Date: 23 RARS: Readmission Risk Score: 19.9      Last Discharge Facility       Date Complaint Diagnosis Description Type Department Provider    23  Type 2 diabetes mellitus with hyperglycemia, with long-term current use of insulin [facility]) Admission (Discharged) Viraj Noel MD            Was this an external facility discharge? Yes,   Discharge Facility: [address] [place] [address] rehab    Challenges to be reviewed by the provider   Additional needs identified to be addressed with provider: No  none               Method of communication with provider: none. CTN spoke with patient after recent D/C from inpatient rehab program. Patient just left f/u visit with orthopedics after rupture of quadriceps tendon. Patient indicated he was doing well and had all questions answered by Orthopedics. Patient does not have any further question/concerns or acute needs. CTN to follow up. Care Transition Nurse reviewed discharge instructions, medical action plan, and red flags with patient who verbalized understanding. The patient was given an opportunity to ask questions and does not have any further questions or concerns at this time. Were discharge instructions available to patient? Yes. Reviewed appropriate site of care based on symptoms and resources available to patient including: Specialist  When to call 911  CTN .  The patient agrees to contact the PCP office for questions

## 2023-11-30 ENCOUNTER — HOME CARE VISIT (OUTPATIENT)
Dept: SCHEDULING | Facility: HOME HEALTH | Age: 63
End: 2023-11-30
Payer: MEDICAID

## 2023-11-30 VITALS
BODY MASS INDEX: 20.46 KG/M2 | TEMPERATURE: 98 F | RESPIRATION RATE: 18 BRPM | HEART RATE: 80 BPM | WEIGHT: 168 LBS | OXYGEN SATURATION: 95 % | SYSTOLIC BLOOD PRESSURE: 112 MMHG | DIASTOLIC BLOOD PRESSURE: 80 MMHG | HEIGHT: 76 IN

## 2023-11-30 PROCEDURE — G0299 HHS/HOSPICE OF RN EA 15 MIN: HCPCS

## 2023-11-30 ASSESSMENT — ENCOUNTER SYMPTOMS: DYSPNEA ACTIVITY LEVEL: AFTER AMBULATING 10 - 20 FT

## 2023-12-04 ENCOUNTER — HOME CARE VISIT (OUTPATIENT)
Dept: SCHEDULING | Facility: HOME HEALTH | Age: 63
End: 2023-12-04
Payer: MEDICAID

## 2023-12-04 VITALS
OXYGEN SATURATION: 100 % | DIASTOLIC BLOOD PRESSURE: 84 MMHG | TEMPERATURE: 98.4 F | SYSTOLIC BLOOD PRESSURE: 136 MMHG | HEART RATE: 72 BPM | RESPIRATION RATE: 16 BRPM

## 2023-12-04 PROCEDURE — G0299 HHS/HOSPICE OF RN EA 15 MIN: HCPCS

## 2023-12-04 ASSESSMENT — ENCOUNTER SYMPTOMS
DIARRHEA: 1
DYSPNEA ACTIVITY LEVEL: AFTER AMBULATING MORE THAN 20 FT
STOOL DESCRIPTION: LOOSE

## 2023-12-05 ENCOUNTER — HOME CARE VISIT (OUTPATIENT)
Dept: SCHEDULING | Facility: HOME HEALTH | Age: 63
End: 2023-12-05
Payer: MEDICAID

## 2023-12-05 VITALS
HEART RATE: 79 BPM | DIASTOLIC BLOOD PRESSURE: 82 MMHG | OXYGEN SATURATION: 98 % | TEMPERATURE: 98.3 F | SYSTOLIC BLOOD PRESSURE: 135 MMHG

## 2023-12-05 PROCEDURE — G0151 HHCP-SERV OF PT,EA 15 MIN: HCPCS

## 2023-12-05 ASSESSMENT — ENCOUNTER SYMPTOMS: PAIN LOCATION - PAIN QUALITY: SORE, STIFF

## 2023-12-06 ENCOUNTER — HOME CARE VISIT (OUTPATIENT)
Dept: HOME HEALTH SERVICES | Facility: HOME HEALTH | Age: 63
End: 2023-12-06
Payer: MEDICAID

## 2023-12-07 ENCOUNTER — HOME CARE VISIT (OUTPATIENT)
Dept: SCHEDULING | Facility: HOME HEALTH | Age: 63
End: 2023-12-07
Payer: MEDICAID

## 2023-12-08 ENCOUNTER — CARE COORDINATION (OUTPATIENT)
Dept: CARE COORDINATION | Facility: CLINIC | Age: 63
End: 2023-12-08

## 2023-12-08 NOTE — CARE COORDINATION
Ctn attempted follow up after recent MARIE call. CTN unsuccessful in reaching patient and will attempt again at a later time.

## 2023-12-15 ENCOUNTER — OFFICE VISIT (OUTPATIENT)
Dept: INTERNAL MEDICINE CLINIC | Facility: CLINIC | Age: 63
End: 2023-12-15

## 2023-12-15 VITALS
HEIGHT: 76 IN | SYSTOLIC BLOOD PRESSURE: 141 MMHG | RESPIRATION RATE: 16 BRPM | TEMPERATURE: 98.1 F | WEIGHT: 170.4 LBS | DIASTOLIC BLOOD PRESSURE: 80 MMHG | BODY MASS INDEX: 20.75 KG/M2 | HEART RATE: 73 BPM

## 2023-12-15 DIAGNOSIS — S76.112D RUPTURE OF LEFT QUADRICEPS TENDON, SUBSEQUENT ENCOUNTER: ICD-10-CM

## 2023-12-15 DIAGNOSIS — N40.0 BENIGN PROSTATIC HYPERPLASIA WITHOUT LOWER URINARY TRACT SYMPTOMS: ICD-10-CM

## 2023-12-15 DIAGNOSIS — Z23 NEED FOR PROPHYLACTIC VACCINATION AGAINST STREPTOCOCCUS PNEUMONIAE (PNEUMOCOCCUS): Primary | ICD-10-CM

## 2023-12-15 DIAGNOSIS — E11.21 TYPE 2 DIABETES WITH NEPHROPATHY (HCC): ICD-10-CM

## 2023-12-15 DIAGNOSIS — D64.9 SEVERE ANEMIA: ICD-10-CM

## 2023-12-15 DIAGNOSIS — D63.1 ANEMIA DUE TO STAGE 4 CHRONIC KIDNEY DISEASE (HCC): ICD-10-CM

## 2023-12-15 DIAGNOSIS — Z79.4 TYPE 2 DIABETES MELLITUS WITH HYPERGLYCEMIA, WITH LONG-TERM CURRENT USE OF INSULIN (HCC): ICD-10-CM

## 2023-12-15 DIAGNOSIS — E11.65 TYPE 2 DIABETES MELLITUS WITH HYPERGLYCEMIA, WITHOUT LONG-TERM CURRENT USE OF INSULIN (HCC): ICD-10-CM

## 2023-12-15 DIAGNOSIS — E11.65 TYPE 2 DIABETES MELLITUS WITH HYPERGLYCEMIA, WITH LONG-TERM CURRENT USE OF INSULIN (HCC): ICD-10-CM

## 2023-12-15 DIAGNOSIS — N18.4 ANEMIA DUE TO STAGE 4 CHRONIC KIDNEY DISEASE (HCC): ICD-10-CM

## 2023-12-15 DIAGNOSIS — N18.4 STAGE 4 CHRONIC KIDNEY DISEASE (HCC): ICD-10-CM

## 2023-12-15 DIAGNOSIS — I10 ESSENTIAL HYPERTENSION: ICD-10-CM

## 2023-12-15 RX ORDER — TAMSULOSIN HYDROCHLORIDE 0.4 MG/1
CAPSULE ORAL
Qty: 90 CAPSULE | Refills: 3 | Status: SHIPPED | OUTPATIENT
Start: 2023-12-15

## 2023-12-15 RX ORDER — BLOOD-GLUCOSE METER
EACH MISCELLANEOUS
Qty: 1 KIT | Refills: 11 | Status: SHIPPED | OUTPATIENT
Start: 2023-12-15

## 2023-12-15 RX ORDER — BLOOD SUGAR DIAGNOSTIC
STRIP MISCELLANEOUS
Qty: 100 EACH | Refills: 3 | Status: SHIPPED | OUTPATIENT
Start: 2023-12-15

## 2023-12-15 RX ORDER — ALOGLIPTIN 25 MG/1
25 TABLET, FILM COATED ORAL DAILY
Qty: 90 TABLET | Refills: 3 | Status: SHIPPED | OUTPATIENT
Start: 2023-12-15

## 2023-12-15 NOTE — PROGRESS NOTES
this has caused hypoglycemia. His monitor shows average of 1 15-1 20 on Januvia  - alogliptin (NESINA) 25 MG TABS tablet; Take 1 tablet by mouth daily  Dispense: 90 tablet; Refill: 3  - Blood Glucose Monitoring Suppl (ONE TOUCH ULTRA 2) w/Device KIT; Use to check blood sugars once daily. Dx Code E11.9  Dispense: 1 kit; Refill: 11  - blood glucose test strips (ONETOUCH ULTRA) strip; Use to check blood sugars once daily. Dx Code E11.9  Dispense: 100 each; Refill: 3    2. Benign prostatic hyperplasia without lower urinary tract symptoms     - tamsulosin (FLOMAX) 0.4 MG capsule; TAKE 1 CAPSULE BY MOUTH ONCE DAILY  Dispense: 90 capsule; Refill: 3    3. Type 2 diabetes mellitus with hyperglycemia, with long-term current use of insulin ([address])       4. Need for prophylactic vaccination against Streptococcus pneumoniae (pneumococcus)     - Pneumococcal, PCV20, PREVNAR 20, (age 6w+), IM, PF    5. Rupture of left quadriceps tendon, subsequent encounter  Followed by orthopedic surgery he has completed physical therapy and is ambulating    6. Stage 4 chronic kidney disease ([address])  Has appointment with nephrology he is being prepared for the inevitable which is hemodialysis. He is acidotic    7. Anemia due to stage 4 chronic kidney disease (HCC)  Hemoglobin is stable in the mid 7 range she should be a candidate for Procrit      No follow-up provider specified.         Pancho Staton MD

## 2023-12-17 PROBLEM — N18.30 CKD (CHRONIC KIDNEY DISEASE) STAGE 3, GFR 30-59 ML/MIN (HCC): Status: RESOLVED | Noted: 2019-08-25 | Resolved: 2023-12-17

## 2023-12-17 PROBLEM — M25.562 LEFT KNEE PAIN: Status: RESOLVED | Noted: 2023-11-13 | Resolved: 2023-12-17

## 2023-12-17 PROBLEM — R26.89 NOT BEARING WEIGHT ON LOWER EXTREMITY: Status: RESOLVED | Noted: 2023-11-16 | Resolved: 2023-12-17

## 2023-12-17 PROBLEM — G89.18 POST-OP PAIN: Status: RESOLVED | Noted: 2023-11-22 | Resolved: 2023-12-17

## 2023-12-17 PROBLEM — Z78.9 DECREASED INDEPENDENCE WITH ACTIVITIES OF DAILY LIVING: Status: RESOLVED | Noted: 2023-11-04 | Resolved: 2023-12-17

## 2023-12-17 PROBLEM — Z89.511 HX OF RIGHT BKA (HCC): Status: RESOLVED | Noted: 2023-11-22 | Resolved: 2023-12-17

## 2023-12-17 PROBLEM — S76.112A QUADRICEPS TENDON RUPTURE, LEFT, INITIAL ENCOUNTER: Status: RESOLVED | Noted: 2023-11-16 | Resolved: 2023-12-17

## 2023-12-23 ENCOUNTER — TELEPHONE (OUTPATIENT)
Dept: INTERNAL MEDICINE CLINIC | Facility: CLINIC | Age: 63
End: 2023-12-23

## 2023-12-23 DIAGNOSIS — E11.65 TYPE 2 DIABETES MELLITUS WITH HYPERGLYCEMIA, WITHOUT LONG-TERM CURRENT USE OF INSULIN (HCC): Primary | ICD-10-CM

## 2024-01-16 ENCOUNTER — CARE COORDINATION (OUTPATIENT)
Dept: CARE COORDINATION | Facility: CLINIC | Age: 64
End: 2024-01-16

## 2024-02-21 ENCOUNTER — OFFICE VISIT (OUTPATIENT)
Dept: ORTHOPEDIC SURGERY | Age: 64
End: 2024-02-21
Payer: MEDICAID

## 2024-02-21 DIAGNOSIS — Z09 SURGERY FOLLOW-UP: ICD-10-CM

## 2024-02-21 DIAGNOSIS — S76.112S RUPTURE OF LEFT QUADRICEPS TENDON, SEQUELA: ICD-10-CM

## 2024-02-21 DIAGNOSIS — M25.562 LEFT KNEE PAIN, UNSPECIFIED CHRONICITY: Primary | ICD-10-CM

## 2024-02-21 PROCEDURE — 99213 OFFICE O/P EST LOW 20 MIN: CPT | Performed by: ORTHOPAEDIC SURGERY

## 2024-02-21 NOTE — PROGRESS NOTES
Currently    Drug use: Not Currently    Sexual activity: Not on file   Other Topics Concern    Not on file   Social History Narrative    Not on file     Social Determinants of Health     Financial Resource Strain: Low Risk  (7/13/2023)    Overall Financial Resource Strain (CARDIA)     Difficulty of Paying Living Expenses: Not very hard   Food Insecurity: Not on file (11/21/2023)   Recent Concern: Food Insecurity - Food Insecurity Present (11/21/2023)    Hunger Vital Sign     Worried About Running Out of Food in the Last Year: Sometimes true     Ran Out of Food in the Last Year: Sometimes true   Transportation Needs: No Transportation Needs (12/20/2023)    OASIS : Transportation     Lack of Transportation (Medical): No     Lack of Transportation (Non-Medical): No     Patient Unable or Declines to Respond: No   Physical Activity: Not on file   Stress: Not on file   Social Connections: Feeling Socially Integrated (12/20/2023)    OASIS : Social Isolation     Frequency of experiencing loneliness or isolation: Never   Intimate Partner Violence: Not on file   Housing Stability: High Risk (11/21/2023)    Housing Stability Vital Sign     Unable to Pay for Housing in the Last Year: Yes     Number of Places Lived in the Last Year: 2     Unstable Housing in the Last Year: No               No data to display                  Review of Systems  Non-contributory     PE:    Incisions all appear to be healing well with no signs of erythema, drainage, or other abnormality.  Full range of motion of the knee.  Positive effusion.  There is noted atrophy of the quad.  However, good strength noted.  Distal motor function, sensation, pulse intact.    Xray: Not indicated    A/Plan:     ICD-10-CM    1. Left knee pain, unspecified chronicity  M25.562       2. Rupture of left quadriceps tendon, sequela  S76.112S       3. Surgery follow-up  Z09           I am pleased that the patient is doing well in regards to stability, range of

## 2024-03-14 ENCOUNTER — OFFICE VISIT (OUTPATIENT)
Dept: INTERNAL MEDICINE CLINIC | Facility: CLINIC | Age: 64
End: 2024-03-14
Payer: MEDICAID

## 2024-03-14 VITALS
RESPIRATION RATE: 16 BRPM | BODY MASS INDEX: 20.22 KG/M2 | TEMPERATURE: 97.7 F | SYSTOLIC BLOOD PRESSURE: 111 MMHG | HEART RATE: 85 BPM | HEIGHT: 76 IN | WEIGHT: 166 LBS | DIASTOLIC BLOOD PRESSURE: 60 MMHG

## 2024-03-14 DIAGNOSIS — I10 ESSENTIAL HYPERTENSION: ICD-10-CM

## 2024-03-14 DIAGNOSIS — E11.65 TYPE 2 DIABETES MELLITUS WITH HYPERGLYCEMIA, WITH LONG-TERM CURRENT USE OF INSULIN (HCC): Primary | ICD-10-CM

## 2024-03-14 DIAGNOSIS — S76.112D RUPTURE OF LEFT QUADRICEPS TENDON, SUBSEQUENT ENCOUNTER: ICD-10-CM

## 2024-03-14 DIAGNOSIS — N18.4 STAGE 4 CHRONIC KIDNEY DISEASE (HCC): ICD-10-CM

## 2024-03-14 DIAGNOSIS — Z79.4 TYPE 2 DIABETES MELLITUS WITH HYPERGLYCEMIA, WITH LONG-TERM CURRENT USE OF INSULIN (HCC): Primary | ICD-10-CM

## 2024-03-14 PROCEDURE — 3078F DIAST BP <80 MM HG: CPT | Performed by: INTERNAL MEDICINE

## 2024-03-14 PROCEDURE — 99214 OFFICE O/P EST MOD 30 MIN: CPT | Performed by: INTERNAL MEDICINE

## 2024-03-14 PROCEDURE — 3074F SYST BP LT 130 MM HG: CPT | Performed by: INTERNAL MEDICINE

## 2024-03-14 RX ORDER — ERGOCALCIFEROL 1.25 MG/1
50000 CAPSULE ORAL WEEKLY
COMMUNITY
Start: 2024-02-13

## 2024-03-14 NOTE — PROGRESS NOTES
glucose test strips (ONETOUCH ULTRA) strip Use to check blood sugars once daily. Dx Code E11.9 100 each 3    insulin glargine (BASAGLAR KWIKPEN) 100 UNIT/ML injection pen Inject 10 Units into the skin nightly 3 mL 0    losartan (COZAAR) 50 MG tablet Take 1 tablet by mouth daily      sodium bicarbonate 650 MG tablet Take 2 tablets by mouth 3 times daily      Accu-Chek Multiclix Lancets MISC 1 applicator by Does not apply route daily 100 each 3    blood glucose test strips (ASCENSIA AUTODISC VI;ONE TOUCH ULTRA TEST VI) strip 1 each by In Vitro route daily As needed. 100 each 3    Insulin Pen Needle 29G X 12MM MISC 1 each by Does not apply route daily 100 each 3    Lancet Devices (ONETOUCH DELICA SAFETY LANCING) MISC 1 each by Does not apply route daily 100 each 3    OneTouch Delica Lancets 30G MISC 1 each by Does not apply route daily 100 each 3    loperamide (IMODIUM A-D) 2 MG tablet Take 1 tablet by mouth 4 times daily as needed       No current facility-administered medications for this visit.       Orders Placed This Encounter   Procedures    Hemoglobin A1C       There are no discontinued medications.    1. Type 2 diabetes mellitus with hyperglycemia, with long-term current use of insulin (HCC)  Will see if he can have a hemoglobin A1c drawn at his next nephrology blood work  - Hemoglobin A1C    2. Essential hypertension   Controlled    3. Rupture of left quadriceps tendon, subsequent encounter  Healing well postsurgery    4. Stage 4 chronic kidney disease (HCC)  See above soon to be at dialysis patient      Return in about 6 months (around 9/14/2024).     Records from other physicians, or healthcare facilities were reviewed for approx 10 min      Sreedhar Contreras MD

## 2024-04-04 ENCOUNTER — TELEPHONE (OUTPATIENT)
Dept: INTERNAL MEDICINE CLINIC | Facility: CLINIC | Age: 64
End: 2024-04-04

## 2024-04-04 DIAGNOSIS — N18.4 STAGE 4 CHRONIC KIDNEY DISEASE (HCC): Primary | ICD-10-CM

## 2024-04-04 NOTE — TELEPHONE ENCOUNTER
Dr. Javier SHAVER has called and stated that she needed a referral for Mr. Ross to have dialysis. He will be a [facility]  [facility].    The fax number and the phone number is the same she stated.   Fax  [phone]  She is also sending over a form to fill out.

## 2024-04-08 ENCOUNTER — TELEPHONE (OUTPATIENT)
Dept: INTERNAL MEDICINE CLINIC | Facility: CLINIC | Age: 64
End: 2024-04-08

## 2024-04-08 NOTE — TELEPHONE ENCOUNTER
Javier is calling to check on a referral. Referral needs to be faxed to [phone].  I have faxed over referral.

## 2024-04-12 DIAGNOSIS — E11.65 TYPE 2 DIABETES MELLITUS WITH HYPERGLYCEMIA, WITHOUT LONG-TERM CURRENT USE OF INSULIN (HCC): ICD-10-CM

## 2024-04-12 RX ORDER — BLOOD SUGAR DIAGNOSTIC
STRIP MISCELLANEOUS
Qty: 100 EACH | Refills: 11 | Status: SHIPPED | OUTPATIENT
Start: 2024-04-12

## 2024-07-18 ENCOUNTER — OFFICE VISIT (OUTPATIENT)
Dept: INTERNAL MEDICINE CLINIC | Facility: CLINIC | Age: 64
End: 2024-07-18
Payer: MEDICAID

## 2024-07-18 VITALS
BODY MASS INDEX: 20.58 KG/M2 | HEART RATE: 78 BPM | TEMPERATURE: 97 F | OXYGEN SATURATION: 100 % | WEIGHT: 169 LBS | HEIGHT: 76 IN | DIASTOLIC BLOOD PRESSURE: 72 MMHG | SYSTOLIC BLOOD PRESSURE: 126 MMHG

## 2024-07-18 DIAGNOSIS — Z89.511 S/P BKA (BELOW KNEE AMPUTATION), RIGHT (HCC): Primary | ICD-10-CM

## 2024-07-18 DIAGNOSIS — Z02.89 ENCOUNTER FOR COMPLETION OF FORM WITH PATIENT: ICD-10-CM

## 2024-07-18 DIAGNOSIS — E11.42 DIABETIC POLYNEUROPATHY ASSOCIATED WITH TYPE 2 DIABETES MELLITUS (HCC): ICD-10-CM

## 2024-07-18 DIAGNOSIS — N18.4 STAGE 4 CHRONIC KIDNEY DISEASE (HCC): ICD-10-CM

## 2024-07-18 PROCEDURE — 3078F DIAST BP <80 MM HG: CPT | Performed by: NURSE PRACTITIONER

## 2024-07-18 PROCEDURE — 99214 OFFICE O/P EST MOD 30 MIN: CPT | Performed by: NURSE PRACTITIONER

## 2024-07-18 PROCEDURE — 3074F SYST BP LT 130 MM HG: CPT | Performed by: NURSE PRACTITIONER

## 2024-07-18 RX ORDER — LOSARTAN POTASSIUM 50 MG/1
50 TABLET ORAL DAILY
Qty: 90 TABLET | Refills: 3 | Status: SHIPPED | OUTPATIENT
Start: 2024-07-18

## 2024-07-18 RX ORDER — SODIUM BICARBONATE 650 MG/1
1300 TABLET ORAL 3 TIMES DAILY
Qty: 360 TABLET | Refills: 3 | Status: SHIPPED | OUTPATIENT
Start: 2024-07-18

## 2024-07-18 ASSESSMENT — ENCOUNTER SYMPTOMS
VOMITING: 0
ABDOMINAL PAIN: 0
CONSTIPATION: 0
DIARRHEA: 0
SHORTNESS OF BREATH: 0
NAUSEA: 0

## 2024-07-18 NOTE — PROGRESS NOTES
breakdown of his right BKA stump.    2. Encounter for completion of form with patient      3. Diabetic polyneuropathy associated with type 2 diabetes mellitus (HCC)  Discussed avoiding hypoglycemia, is still injecting insulin but last A1c was 5.3.  Reports that he gets labs regularly checked with nephrology, declines lab work today.  Has upcoming follow-up for routine medical care with Dr. Contreras.    4. Stage 4 chronic kidney disease (HCC)  Is on peritoneal dialysis, on the kidney transplant list.  Abdomen is soft and nontender, knows to watch for signs of peritonitis  Sodium and bicarbonate refills called in for him today as requested  Continues losartan which was called in for him as well  Review of records show that he has been on these medications and they have not been discontinued.        Lacy Cleary, APRN - CNP

## 2024-07-26 ENCOUNTER — TELEPHONE (OUTPATIENT)
Dept: INTERNAL MEDICINE CLINIC | Facility: CLINIC | Age: 64
End: 2024-07-26

## 2024-07-26 NOTE — TELEPHONE ENCOUNTER
Nanda [facility] has called to check on the status of a  request for medical records on July 22, and she still hasn't  received anything yet . The fax number is      Fax [phone]

## 2024-08-13 ENCOUNTER — OFFICE VISIT (OUTPATIENT)
Dept: INTERNAL MEDICINE CLINIC | Facility: CLINIC | Age: 64
End: 2024-08-13
Payer: MEDICAID

## 2024-08-13 VITALS
HEIGHT: 76 IN | SYSTOLIC BLOOD PRESSURE: 87 MMHG | TEMPERATURE: 97.3 F | RESPIRATION RATE: 16 BRPM | BODY MASS INDEX: 19.95 KG/M2 | DIASTOLIC BLOOD PRESSURE: 55 MMHG | HEART RATE: 84 BPM | WEIGHT: 163.8 LBS

## 2024-08-13 DIAGNOSIS — Z79.4 TYPE 2 DIABETES MELLITUS WITH CHRONIC KIDNEY DISEASE ON CHRONIC DIALYSIS, WITH LONG-TERM CURRENT USE OF INSULIN (HCC): ICD-10-CM

## 2024-08-13 DIAGNOSIS — Z99.2 TYPE 2 DIABETES MELLITUS WITH CHRONIC KIDNEY DISEASE ON CHRONIC DIALYSIS, WITH LONG-TERM CURRENT USE OF INSULIN (HCC): ICD-10-CM

## 2024-08-13 DIAGNOSIS — E11.22 TYPE 2 DIABETES MELLITUS WITH CHRONIC KIDNEY DISEASE ON CHRONIC DIALYSIS, WITH LONG-TERM CURRENT USE OF INSULIN (HCC): ICD-10-CM

## 2024-08-13 DIAGNOSIS — N40.0 BENIGN NON-NODULAR PROSTATIC HYPERPLASIA WITHOUT LOWER URINARY TRACT SYMPTOMS: ICD-10-CM

## 2024-08-13 DIAGNOSIS — N18.6 TYPE 2 DIABETES MELLITUS WITH CHRONIC KIDNEY DISEASE ON CHRONIC DIALYSIS, WITH LONG-TERM CURRENT USE OF INSULIN (HCC): ICD-10-CM

## 2024-08-13 DIAGNOSIS — E11.42 DIABETIC POLYNEUROPATHY ASSOCIATED WITH TYPE 2 DIABETES MELLITUS (HCC): ICD-10-CM

## 2024-08-13 DIAGNOSIS — Z74.09 IMPAIRED FUNCTIONAL MOBILITY, BALANCE, GAIT, AND ENDURANCE: ICD-10-CM

## 2024-08-13 DIAGNOSIS — E11.21 TYPE 2 DIABETES WITH NEPHROPATHY (HCC): ICD-10-CM

## 2024-08-13 DIAGNOSIS — I10 ESSENTIAL HYPERTENSION: Primary | ICD-10-CM

## 2024-08-13 PROBLEM — D64.9 SEVERE ANEMIA: Status: RESOLVED | Noted: 2023-11-22 | Resolved: 2024-08-13

## 2024-08-13 PROBLEM — S76.112A RUPTURE OF LEFT QUADRICEPS TENDON: Status: RESOLVED | Noted: 2023-11-13 | Resolved: 2024-08-13

## 2024-08-13 PROCEDURE — 3078F DIAST BP <80 MM HG: CPT | Performed by: INTERNAL MEDICINE

## 2024-08-13 PROCEDURE — 3074F SYST BP LT 130 MM HG: CPT | Performed by: INTERNAL MEDICINE

## 2024-08-13 PROCEDURE — G2211 COMPLEX E/M VISIT ADD ON: HCPCS | Performed by: INTERNAL MEDICINE

## 2024-08-13 PROCEDURE — 99214 OFFICE O/P EST MOD 30 MIN: CPT | Performed by: INTERNAL MEDICINE

## 2024-08-13 SDOH — ECONOMIC STABILITY: INCOME INSECURITY: HOW HARD IS IT FOR YOU TO PAY FOR THE VERY BASICS LIKE FOOD, HOUSING, MEDICAL CARE, AND HEATING?: NOT HARD AT ALL

## 2024-08-13 SDOH — ECONOMIC STABILITY: FOOD INSECURITY: WITHIN THE PAST 12 MONTHS, THE FOOD YOU BOUGHT JUST DIDN'T LAST AND YOU DIDN'T HAVE MONEY TO GET MORE.: NEVER TRUE

## 2024-08-13 SDOH — ECONOMIC STABILITY: FOOD INSECURITY: WITHIN THE PAST 12 MONTHS, YOU WORRIED THAT YOUR FOOD WOULD RUN OUT BEFORE YOU GOT MONEY TO BUY MORE.: NEVER TRUE

## 2024-08-13 ASSESSMENT — PATIENT HEALTH QUESTIONNAIRE - PHQ9
SUM OF ALL RESPONSES TO PHQ QUESTIONS 1-9: 0
SUM OF ALL RESPONSES TO PHQ QUESTIONS 1-9: 0
2. FEELING DOWN, DEPRESSED OR HOPELESS: NOT AT ALL
1. LITTLE INTEREST OR PLEASURE IN DOING THINGS: NOT AT ALL
SUM OF ALL RESPONSES TO PHQ QUESTIONS 1-9: 0
SUM OF ALL RESPONSES TO PHQ9 QUESTIONS 1 & 2: 0
SUM OF ALL RESPONSES TO PHQ QUESTIONS 1-9: 0

## 2024-10-07 ENCOUNTER — TELEPHONE (OUTPATIENT)
Dept: INTERNAL MEDICINE CLINIC | Facility: CLINIC | Age: 64
End: 2024-10-07

## 2024-10-07 NOTE — TELEPHONE ENCOUNTER
Pt's insurance will not cover the Nesina 25 mg.  The medication is not on the the pt's formulary.    Would like for the pcp to consider switching the pt to a formulary alternative       Formulary Alternative   Januvia

## 2024-12-31 DIAGNOSIS — N40.0 BENIGN PROSTATIC HYPERPLASIA WITHOUT LOWER URINARY TRACT SYMPTOMS: ICD-10-CM

## 2024-12-31 RX ORDER — TAMSULOSIN HYDROCHLORIDE 0.4 MG/1
CAPSULE ORAL
Qty: 90 CAPSULE | Refills: 3 | Status: SHIPPED | OUTPATIENT
Start: 2024-12-31

## 2025-01-13 DIAGNOSIS — N40.0 BENIGN PROSTATIC HYPERPLASIA WITHOUT LOWER URINARY TRACT SYMPTOMS: ICD-10-CM

## 2025-01-13 RX ORDER — TAMSULOSIN HYDROCHLORIDE 0.4 MG/1
CAPSULE ORAL
Qty: 90 CAPSULE | Refills: 3 | OUTPATIENT
Start: 2025-01-13

## 2025-01-17 NOTE — PROGRESS NOTES
Spoke to patient's mother and provided the following information:     Please review letter of instruction provided by surgeon's office (by mail, in person, or in [facility] under letters). This includes diet and fasting directions that may start the evening prior to surgery. If you have not received them, please reach out to surgeon's office as soon as possible.     Wear comfortable loose fitting clothing    Leave any valuables at home or with support person, including wallet and jewelry     Remove all jewelry and piercings, wedding bands included    Bring photo ID, insurance card and copay if needed.    Bathe or shower noc prior or am of surgery, no lotion, dry hair, no contacts and no deodorant.    Please take the following medications with a sip of water AM prescription meds     Arrival time will be given Tuesday 1/21 (2 business days prior to surgery) in the AFTERNOON     Disregard any text, automatic calls, or [facility] jean times if given    [address] in [place] - come in through the first door and the surgery center door is immediately on your left - then proceed downstairs to check in.      Please bring the appropriate mobility device that will be required post-operatively; this would include crutches, knee scooters, walkers, etc.    Have an adult drive you to and from procedure and have someone at home with you for the first 24 hours post op.     Parent or guardian has to remain in surgical dept during entire stay.     It is preferred that no additional children come with. If a sibling or another child will be coming, another adult must be present to care for sibling/child at all times.        UNIT/ML injection pen Inject 10 Units into the skin nightly 3 mL 0    blood glucose test strips (ASCENSIA AUTODISC VI;ONE TOUCH ULTRA TEST VI) strip 1 each by In Vitro route daily As needed. 100 each 3    Insulin Pen Needle 29G X 12MM MISC 1 each by Does not apply route daily 100 each 3    Lancet Devices (ONETOUCH DELICA SAFETY LANCING) MISC 1 each by Does not apply route daily 100 each 3    OneTouch Delica Lancets 30G MISC 1 each by Does not apply route daily 100 each 3    loperamide (IMODIUM A-D) 2 MG tablet Take 1 tablet by mouth 4 times daily as needed       No current facility-administered medications for this visit.       No orders of the defined types were placed in this encounter.      Medications Discontinued During This Encounter   Medication Reason    vitamin D (ERGOCALCIFEROL) 1.25 MG (44108 UT) CAPS capsule LIST CLEANUP    Accu-Chek Multiclix Lancets MISC LIST CLEANUP       1. Essential hypertension  Systolic is low I will have him decrease losartan to 25 mg he will follow-up with nephrology    2. Diabetic polyneuropathy associated with type 2 diabetes mellitus (HCC)  Noted in the left foot on exam.  He is status post right BKA    3. Type 2 diabetes with nephropathy (HCC)  On dialysis awaiting evaluation for renal transplant    4. Type 2 diabetes mellitus with chronic kidney disease on chronic dialysis, with long-term current use of insulin (HCC)  See above tolerating this well followed by nephrology    5. Benign non-nodular prostatic hyperplasia without lower urinary tract symptoms  He does have some urine flow although minimal    6. Impaired functional mobility, balance, gait, and endurance  No falls are noted      Return in about 6 months (around 2/13/2025).         Sreedhar Contreras MD

## 2025-02-13 ENCOUNTER — OFFICE VISIT (OUTPATIENT)
Dept: INTERNAL MEDICINE CLINIC | Facility: CLINIC | Age: 65
End: 2025-02-13
Payer: MEDICARE

## 2025-02-13 VITALS
RESPIRATION RATE: 16 BRPM | HEIGHT: 76 IN | BODY MASS INDEX: 20.34 KG/M2 | WEIGHT: 167 LBS | TEMPERATURE: 97.4 F | HEART RATE: 86 BPM | SYSTOLIC BLOOD PRESSURE: 89 MMHG | DIASTOLIC BLOOD PRESSURE: 62 MMHG

## 2025-02-13 DIAGNOSIS — Z79.4 TYPE 2 DIABETES MELLITUS WITH CHRONIC KIDNEY DISEASE ON CHRONIC DIALYSIS, WITH LONG-TERM CURRENT USE OF INSULIN (HCC): ICD-10-CM

## 2025-02-13 DIAGNOSIS — Z99.2 STAGE 5 CHRONIC KIDNEY DISEASE ON CHRONIC DIALYSIS (HCC): ICD-10-CM

## 2025-02-13 DIAGNOSIS — E11.21 TYPE 2 DIABETES WITH NEPHROPATHY (HCC): ICD-10-CM

## 2025-02-13 DIAGNOSIS — Z99.2 TYPE 2 DIABETES MELLITUS WITH CHRONIC KIDNEY DISEASE ON CHRONIC DIALYSIS, WITH LONG-TERM CURRENT USE OF INSULIN (HCC): ICD-10-CM

## 2025-02-13 DIAGNOSIS — I10 ESSENTIAL HYPERTENSION: Primary | ICD-10-CM

## 2025-02-13 DIAGNOSIS — N18.6 TYPE 2 DIABETES MELLITUS WITH CHRONIC KIDNEY DISEASE ON CHRONIC DIALYSIS, WITH LONG-TERM CURRENT USE OF INSULIN (HCC): ICD-10-CM

## 2025-02-13 DIAGNOSIS — E11.22 TYPE 2 DIABETES MELLITUS WITH CHRONIC KIDNEY DISEASE ON CHRONIC DIALYSIS, WITH LONG-TERM CURRENT USE OF INSULIN (HCC): ICD-10-CM

## 2025-02-13 DIAGNOSIS — N18.6 STAGE 5 CHRONIC KIDNEY DISEASE ON CHRONIC DIALYSIS (HCC): ICD-10-CM

## 2025-02-13 DIAGNOSIS — E11.42 DIABETIC POLYNEUROPATHY ASSOCIATED WITH TYPE 2 DIABETES MELLITUS (HCC): ICD-10-CM

## 2025-02-13 PROCEDURE — 3078F DIAST BP <80 MM HG: CPT | Performed by: INTERNAL MEDICINE

## 2025-02-13 PROCEDURE — 3046F HEMOGLOBIN A1C LEVEL >9.0%: CPT | Performed by: INTERNAL MEDICINE

## 2025-02-13 PROCEDURE — 3074F SYST BP LT 130 MM HG: CPT | Performed by: INTERNAL MEDICINE

## 2025-02-13 PROCEDURE — 1036F TOBACCO NON-USER: CPT | Performed by: INTERNAL MEDICINE

## 2025-02-13 PROCEDURE — 2022F DILAT RTA XM EVC RTNOPTHY: CPT | Performed by: INTERNAL MEDICINE

## 2025-02-13 PROCEDURE — G8420 CALC BMI NORM PARAMETERS: HCPCS | Performed by: INTERNAL MEDICINE

## 2025-02-13 PROCEDURE — G2211 COMPLEX E/M VISIT ADD ON: HCPCS | Performed by: INTERNAL MEDICINE

## 2025-02-13 PROCEDURE — 99214 OFFICE O/P EST MOD 30 MIN: CPT | Performed by: INTERNAL MEDICINE

## 2025-02-13 PROCEDURE — G8427 DOCREV CUR MEDS BY ELIG CLIN: HCPCS | Performed by: INTERNAL MEDICINE

## 2025-02-13 PROCEDURE — 3017F COLORECTAL CA SCREEN DOC REV: CPT | Performed by: INTERNAL MEDICINE

## 2025-02-13 SDOH — ECONOMIC STABILITY: FOOD INSECURITY: WITHIN THE PAST 12 MONTHS, YOU WORRIED THAT YOUR FOOD WOULD RUN OUT BEFORE YOU GOT MONEY TO BUY MORE.: SOMETIMES TRUE

## 2025-02-13 SDOH — ECONOMIC STABILITY: FOOD INSECURITY: WITHIN THE PAST 12 MONTHS, THE FOOD YOU BOUGHT JUST DIDN'T LAST AND YOU DIDN'T HAVE MONEY TO GET MORE.: NEVER TRUE

## 2025-02-13 ASSESSMENT — PATIENT HEALTH QUESTIONNAIRE - PHQ9
SUM OF ALL RESPONSES TO PHQ QUESTIONS 1-9: 0
1. LITTLE INTEREST OR PLEASURE IN DOING THINGS: NOT AT ALL
2. FEELING DOWN, DEPRESSED OR HOPELESS: NOT AT ALL
SUM OF ALL RESPONSES TO PHQ QUESTIONS 1-9: 0
SUM OF ALL RESPONSES TO PHQ9 QUESTIONS 1 & 2: 0

## 2025-02-13 NOTE — PROGRESS NOTES
Returned call. LM req c/b with additional clarification    sensation   Vibratory Sensation: diminished       Assessment & Plan    Encounter Diagnoses   Name Primary?   • Essential hypertension Yes   • Type 2 diabetes mellitus with chronic kidney disease on chronic dialysis, with long-term current use of insulin (HCC)    • Type 2 diabetes with nephropathy (HCC)    • Diabetic polyneuropathy associated with type 2 diabetes mellitus (HCC)    • Stage 5 chronic kidney disease on chronic dialysis (HCC)        Current Outpatient Medications   Medication Sig Dispense Refill   • tamsulosin (FLOMAX) 0.4 MG capsule Take 1 capsule by mouth once daily 90 capsule 3   • SITagliptin (JANUVIA) 100 MG tablet Take 1 tablet by mouth daily 90 tablet 1   • losartan (COZAAR) 50 MG tablet Take 1 tablet by mouth daily (Patient taking differently: Take 0.5 tablets by mouth daily) 90 tablet 3   • sodium bicarbonate 650 MG tablet Take 2 tablets by mouth 3 times daily (Patient taking differently: Take 2 tablets by mouth 4 times daily) 360 tablet 3   • blood glucose test strips (ONETOUCH ULTRA) strip Use to check blood sugars once daily. Dx Code E11.9 100 each 11   • Blood Glucose Monitoring Suppl (ONE TOUCH ULTRA 2) w/Device KIT Use to check blood sugars once daily. Dx Code E11.9 1 kit 11   • insulin glargine (BASAGLAR KWIKPEN) 100 UNIT/ML injection pen Inject 10 Units into the skin nightly 3 mL 0   • blood glucose test strips (ASCENSIA AUTODISC VI;ONE TOUCH ULTRA TEST VI) strip 1 each by In Vitro route daily As needed. 100 each 3   • Insulin Pen Needle 29G X 12MM MISC 1 each by Does not apply route daily 100 each 3   • Lancet Devices (HaierUCH DELICA SAFETY LANCING) MISC 1 each by Does not apply route daily 100 each 3   • OneTouch Delica Lancets 30G MISC 1 each by Does not apply route daily 100 each 3   • loperamide (IMODIUM A-D) 2 MG tablet Take 1 tablet by mouth 4 times daily as needed       No current facility-administered medications for this visit.       No orders of the defined types were placed

## 2025-05-15 ENCOUNTER — TELEPHONE (OUTPATIENT)
Dept: INTERNAL MEDICINE CLINIC | Facility: CLINIC | Age: 65
End: 2025-05-15

## 2025-05-15 NOTE — TELEPHONE ENCOUNTER
Left msg for pt to return my call    Pt is scheduled to see CMS on 9/23/2025  CMS will be out of the office that day and we are needing to reschedule his appt.  I have rescheduled for 10/2/25 at  3:30 pm

## 2025-06-03 NOTE — TELEPHONE ENCOUNTER
Requested Prescriptions     Pending Prescriptions Disp Refills    SITagliptin (ANNEUVIA) 100 MG tablet 90 tablet 1     Sig: Take 1 tablet by mouth daily   Verified [facility]

## 2025-09-05 RX ORDER — LOSARTAN POTASSIUM 50 MG/1
25-50 TABLET ORAL DAILY
Qty: 90 TABLET | Refills: 3 | Status: SHIPPED | OUTPATIENT
Start: 2025-09-05

## (undated) DEVICE — APPLICATOR MEDICATED 26 CC SOLUTION HI LT ORNG CHLORAPREP

## (undated) DEVICE — SUTURE VCRL SZ 2-0 L36IN ABSRB UD L36MM CT-1 1/2 CIR J945H

## (undated) DEVICE — SUTURE FIBERWIRE SZ 5 L38IN NONABSORBABLE BLU L48MM 1/2 AR7211

## (undated) DEVICE — NEEDLE SUT SZ 2 S STL MAYO CATGUT 1/2 CIR TAPR PT

## (undated) DEVICE — DRAPE,U/SHT,SPLIT,FILM,60X84,STERILE: Brand: MEDLINE

## (undated) DEVICE — 4.0MM ROUND BUR

## (undated) DEVICE — BANDAGE COMPR W6INXL12FT SMOOTH FOR LIMB EXSANG ESMARCH

## (undated) DEVICE — SOLUTION IRRIG 1000ML 0.9% SOD CHL USP POUR PLAS BTL

## (undated) DEVICE — SHEET,DRAPE,53X77,STERILE: Brand: MEDLINE

## (undated) DEVICE — GLOVE ORANGE PI 7   MSG9070

## (undated) DEVICE — INTENDED FOR TISSUE SEPARATION, AND OTHER PROCEDURES THAT REQUIRE A SHARP SURGICAL BLADE TO PUNCTURE OR CUT.: Brand: BARD-PARKER ® STAINLESS STEEL BLADES

## (undated) DEVICE — ELECTRODE PT RET AD L9FT HI MOIST COND ADH HYDRGEL CORDED

## (undated) DEVICE — C-ARM: Brand: UNBRANDED

## (undated) DEVICE — Device

## (undated) DEVICE — FOAM BUMP ROUND LARGE: Brand: MEDLINE INDUSTRIES, INC.

## (undated) DEVICE — 3M™ IOBAN™ 2 ANTIMICROBIAL INCISE DRAPE 6650EZ: Brand: IOBAN™ 2

## (undated) DEVICE — SUTURE VCRL SZ 0 L27IN ABSRB UD L36MM CP-1 1/2 CIR REV CUT J267H

## (undated) DEVICE — STOCKINETTE,IMPERVIOUS,12X48,STERILE: Brand: MEDLINE

## (undated) DEVICE — T-DRAPE,EXTREMITY,STERILE: Brand: MEDLINE

## (undated) DEVICE — NEEDLE SPNL L3.5IN PNK HUB S STL REG WALL FIT STYL W/ QNCKE

## (undated) DEVICE — STRIP,CLOSURE,WOUND,MEDI-STRIP,1/2X4: Brand: MEDLINE

## (undated) DEVICE — 4.0MM EGG BUR

## (undated) DEVICE — SUTURE STRATAFIX SPRL MCRYL + SZ 3-0 L18IN ABSRB UD PS-2 SXMP1B107

## (undated) DEVICE — 2DE14 2-0 PDO 7 X7: Brand: 2DE14 2-0 PDO 7 X7

## (undated) DEVICE — TWINFIX 12 INCH SUTURE PASSER, STERILE: Brand: TWINFIX

## (undated) DEVICE — GLOVE SURG SZ 7 L12IN FNGR THK79MIL GRN LTX FREE

## (undated) DEVICE — HEWSON SUTURE RETRIEVER: Brand: HEWSON SUTURE RETRIEVER

## (undated) DEVICE — SURGICAL PROCEDURE PACK BASIC ST FRANCIS

## (undated) DEVICE — SUTURE MCRYL SZ 2-0 L27IN ABSRB UD CP-1 1 L36MM 1/2 CIR REV Y266H

## (undated) DEVICE — PADDING CAST W6INXL4YD ST COT COHESIVE HND TEARABLE SPEC

## (undated) DEVICE — BANDAGE COBAN 6 IN WND 6INX5YD FOAM